# Patient Record
Sex: FEMALE | Race: WHITE | NOT HISPANIC OR LATINO | URBAN - METROPOLITAN AREA
[De-identification: names, ages, dates, MRNs, and addresses within clinical notes are randomized per-mention and may not be internally consistent; named-entity substitution may affect disease eponyms.]

---

## 2020-08-06 PROCEDURE — U0003 INFECTIOUS AGENT DETECTION BY NUCLEIC ACID (DNA OR RNA); SEVERE ACUTE RESPIRATORY SYNDROME CORONAVIRUS 2 (SARS-COV-2) (CORONAVIRUS DISEASE [COVID-19]), AMPLIFIED PROBE TECHNIQUE, MAKING USE OF HIGH THROUGHPUT TECHNOLOGIES AS DESCRIBED BY CMS-2020-01-R: HCPCS | Performed by: FAMILY MEDICINE

## 2020-08-11 ENCOUNTER — TELEPHONE (OUTPATIENT)
Dept: URGENT CARE | Facility: CLINIC | Age: 25
End: 2020-08-11

## 2022-11-21 ENCOUNTER — PATIENT ROUNDING (BHMG ONLY) (OUTPATIENT)
Dept: FAMILY MEDICINE CLINIC | Facility: CLINIC | Age: 27
End: 2022-11-21

## 2022-11-21 ENCOUNTER — OFFICE VISIT (OUTPATIENT)
Dept: FAMILY MEDICINE CLINIC | Facility: CLINIC | Age: 27
End: 2022-11-21

## 2022-11-21 VITALS
WEIGHT: 155.6 LBS | SYSTOLIC BLOOD PRESSURE: 122 MMHG | OXYGEN SATURATION: 100 % | HEIGHT: 65 IN | DIASTOLIC BLOOD PRESSURE: 76 MMHG | BODY MASS INDEX: 25.92 KG/M2 | HEART RATE: 97 BPM

## 2022-11-21 DIAGNOSIS — S22.070S CLOSED WEDGE COMPRESSION FRACTURE OF T10 VERTEBRA, SEQUELA: Primary | ICD-10-CM

## 2022-11-21 DIAGNOSIS — Z00.00 ENCOUNTER FOR ROUTINE HISTORY AND PHYSICAL EXAM IN FEMALE: ICD-10-CM

## 2022-11-21 PROBLEM — S22.070A CLOSED WEDGE COMPRESSION FRACTURE OF T10 VERTEBRA (HCC): Status: ACTIVE | Noted: 2022-11-21

## 2022-11-21 PROCEDURE — 99213 OFFICE O/P EST LOW 20 MIN: CPT | Performed by: INTERNAL MEDICINE

## 2022-11-21 RX ORDER — PSEUDOEPHEDRINE HYDROCHLORIDE 120 MG/1
TABLET, FILM COATED, EXTENDED RELEASE ORAL
COMMUNITY
Start: 2022-10-24 | End: 2022-11-21

## 2022-11-21 RX ORDER — METHOCARBAMOL 750 MG/1
TABLET, FILM COATED ORAL
COMMUNITY
Start: 2022-10-30 | End: 2022-12-15 | Stop reason: SDUPTHER

## 2022-11-21 RX ORDER — OXYCODONE HYDROCHLORIDE 5 MG/1
TABLET ORAL
COMMUNITY
Start: 2022-10-30 | End: 2022-11-21

## 2022-11-21 RX ORDER — ACETAMINOPHEN 500 MG
500 TABLET ORAL EVERY 6 HOURS PRN
COMMUNITY
Start: 2022-10-30 | End: 2022-11-24

## 2022-11-21 RX ORDER — IBUPROFEN 600 MG/1
600 TABLET ORAL EVERY 6 HOURS PRN
COMMUNITY
End: 2022-12-15 | Stop reason: SDUPTHER

## 2022-11-21 NOTE — PROGRESS NOTES
Makayla Ford Epley  1995  [id number]  Patient Care Team:  Jhon Ennis MD as PCP - General (Internal Medicine)    Makayla Ford Epley is a 27 y.o. female here today to establish care.  This patient is accompanied by their self who contributes to the history of their care.    Chief Complaint:    Chief Complaint   Patient presents with   • new patient preventative medicine service   • Fracture back     10/30/2022         History of Present Illness:    Makayla Ford Epley is a 27 y.o. female presenting today with Fall and Back Pain.   With regards to this she was seen on .  who had a fall from an exercise pole at a height of about 2-3 feet after which she landed on her head/neck. Immediately after the event patient felt that she heard a pop and since then she has been having significant tenderness in the thoracic spine region. Had some mild numbness and tingling soon after the event but that has resolved at this time. No loss of consciousness. Patient was placed on a C-collar and brought to the ED by EMS. She underwent CT neck.back with only a T10 compression fracture. Her pain is currently 2/10. She would like to dispose of her Oxycontin. She was also given Robaxin, tylenol.   Pain is typically in her ribs with quick movement. Denies weakness nmbness or tingling. Denies any urinary/bowel incontinence.     Patient has had no previous surgeries to the spine before. Does not take any medications on a daily basis.    She does not have a routine gynecologist, however would like to establish.      History reviewed. No pertinent past medical history.    Past Surgical History:   Procedure Laterality Date   • WISDOM TOOTH EXTRACTION          Family History   Problem Relation Age of Onset   • Mental illness Mother    • Migraine headaches Mother    • Osteoporosis Mother    • Thyroid disease Maternal Grandmother    • Stroke Maternal Grandmother    • Hyperlipidemia Maternal Grandmother    • Hypertension Maternal  "Grandmother    • Diabetes Maternal Grandmother    • Cancer Maternal Grandmother    • Arthritis Maternal Grandmother    • Heart attack Maternal Grandmother    • Kidney disease Maternal Grandmother    • Stroke Maternal Grandfather    • Hyperlipidemia Maternal Grandfather    • Arthritis Maternal Grandfather    • Stroke Paternal Grandmother        Social History     Socioeconomic History   • Marital status: Single   Tobacco Use   • Smoking status: Never   • Smokeless tobacco: Never   • Tobacco comments:     socially   Vaping Use   • Vaping Use: Every day   • Start date: 9/1/2022   • Substances: Nicotine   • Devices: Disposable   Substance and Sexual Activity   • Alcohol use: Yes     Comment: Social   • Drug use: Yes     Types: Marijuana   • Sexual activity: Defer     Partners: Male     Birth control/protection: None       No Known Allergies    Review of Systems:    Review of Systems   Constitutional: Negative.    HENT: Negative.    Respiratory: Negative.    Endocrine: Negative.    Genitourinary: Negative.    Musculoskeletal: Positive for back pain.   Neurological: Negative.    Hematological: Negative.        Vitals:    11/21/22 1446   BP: 122/76   Pulse: 97   SpO2: 100%   Weight: 70.6 kg (155 lb 9.6 oz)   Height: 165.1 cm (65\")     Body mass index is 25.89 kg/m².      Current Outpatient Medications:   •  acetaminophen (TYLENOL) 500 MG tablet, Take 500 mg by mouth Every 6 (Six) Hours As Needed., Disp: , Rfl:   •  ibuprofen (ADVIL,MOTRIN) 600 MG tablet, Take 600 mg by mouth Every 6 (Six) Hours As Needed for Mild Pain., Disp: , Rfl:   •  methocarbamol (ROBAXIN) 750 MG tablet, , Disp: , Rfl:     Physical Exam:    Physical Exam  Vitals and nursing note reviewed.   Constitutional:       General: She is not in acute distress.     Appearance: She is well-developed. She is not diaphoretic.   HENT:      Head: Normocephalic and atraumatic.      Right Ear: External ear normal.      Left Ear: External ear normal.      Mouth/Throat: "      Mouth: Mucous membranes are moist.      Pharynx: No oropharyngeal exudate.   Eyes:      General: No scleral icterus.        Right eye: No discharge.      Conjunctiva/sclera: Conjunctivae normal.   Neck:      Thyroid: No thyromegaly.      Vascular: No JVD.      Trachea: No tracheal deviation.   Cardiovascular:      Rate and Rhythm: Normal rate and regular rhythm.      Heart sounds: Normal heart sounds.      Comments: PMI nondisplaced  Pulmonary:      Effort: Pulmonary effort is normal.      Breath sounds: Normal breath sounds. No wheezing or rales.   Abdominal:      General: Bowel sounds are normal.      Palpations: Abdomen is soft.      Tenderness: There is no abdominal tenderness. There is no guarding or rebound.   Musculoskeletal:         General: Tenderness present.      Cervical back: Normal range of motion and neck supple.      Comments: Normal gait.  Normal upper and lower extremity strength proximally and distally flexor extensor compartments.  He does have midline proximately T9-T11 tenderness.  There is some thoracic paraspinous guarding.  Range of motion appears full.   Lymphadenopathy:      Cervical: No cervical adenopathy.   Skin:     General: Skin is warm and dry.      Capillary Refill: Capillary refill takes less than 2 seconds.      Coloration: Skin is not pale.      Findings: No rash.   Neurological:      Mental Status: She is alert and oriented to person, place, and time.      Motor: No abnormal muscle tone.      Coordination: Coordination normal.   Psychiatric:         Mood and Affect: Mood normal.         Behavior: Behavior normal.         Judgment: Judgment normal.         Procedures    Results Review:    I reviewed the patient's new clinical results.  Exam/Procedure: CT CERVICAL SPINE WO IV CONTRAST ordered by ALYSON ANTONY, [id number]     CLINICAL INDICATION:   fall from height     TECHNIQUE:   Imaging of the entire cervical spine (to include the cervicothoracic junction) was performed, using  spiral technique, without contrast administration. Reformatted images in the coronal and sagittal planes were generated from the axial data set to facilitate diagnostic accuracy and/or surgical planning.     Contiguous axial CT images of the entire thoracic spine were reformatted from a CT of the chest/aorta. Reconstructed images in the coronal and sagittal planes were generated from the axial data set to facilitate diagnostic accuracy and/or surgical planning.     Contiguous axial CT images of the entire lumbar spine were reformatted from a CT of the abdomen and pelvis. Reconstructed images in the coronal and sagittal planes were generated from the axial data set to facilitate diagnostic accuracy and/or surgical planning.     Total DLP (Dose-Length Product): 2210. Please note: The reported value represents the total of one or more individual components during the CT acquisition on this date and at this time, and as such, the same value may appear in more than one CT report depending on the interpreting/reporting physicians.     COMPARISON:   None.     FINDINGS:   Cervical Spine:     Vertebrae: No acute fracture.     Alignment: Normal spinal alignment.     Paraspinal Soft Tissues: No paraspinal hematoma.     Lung Apices: No pneumothorax at the lung apices.     Thoracic Spine:   Vertebrae: Subtle superior endplate compression fracture at T10 without loss of vertebral body height at this level. No extension into the posterior elements. No retropulsion of bony fragments into the spinal canal.     Alignment: Normal spinal alignment.     Paraspinal Soft Tissues: Please see report for CT Chest/Aorta for soft tissue findings.     Lumbar Spine:   Vertebrae: No acute fracture.     Alignment: Normal spinal alignment.       Assessment/Plan:     Problem List Items Addressed This Visit        Neuro    Closed wedge compression fracture of T10 vertebra (HCC) - Primary   Other Visit Diagnoses     Encounter for routine history and  physical exam in female        Relevant Orders    Ambulatory Referral to Gynecology      I have cleared her to go back to work with no stage work no brisk movements no lifting greater than 10 pounds.  She should keep her spine follow-up at  on December 10.  Continue Tylenol and Robaxin as needed.  To dispose of her pain medications she should go to any pharmacy for   med disposal    Plan of care reviewed with patient at the conclusion of today's visit. Education was provided regarding diagnosis and management.  Patient verbalizes understanding of and agreement with management plan.    Return in about 1 year (around 11/21/2023) for Annual.    Jhon Ennis MD      Please note than portions of this note were completed [facility] a Voice Recognition Program

## 2022-12-08 ENCOUNTER — TELEPHONE (OUTPATIENT)
Dept: FAMILY MEDICINE CLINIC | Facility: CLINIC | Age: 27
End: 2022-12-08

## 2022-12-08 DIAGNOSIS — S22.070S CLOSED WEDGE COMPRESSION FRACTURE OF T10 VERTEBRA, SEQUELA: Primary | ICD-10-CM

## 2022-12-08 NOTE — TELEPHONE ENCOUNTER
Caller: Epley, Makayla F    Relationship: Self    Best call back number: [phone]    What is the medical concern/diagnosis: compression spinal fracture    What specialty or service is being requested: orthopedic surgeons or neurosurgeons    What is the provider, practice or medical service name:     What is the office location:     What is the office phone number:     Any additional details: [facility]

## 2022-12-15 ENCOUNTER — OFFICE VISIT (OUTPATIENT)
Dept: FAMILY MEDICINE CLINIC | Facility: CLINIC | Age: 27
End: 2022-12-15

## 2022-12-15 VITALS
HEIGHT: 65 IN | SYSTOLIC BLOOD PRESSURE: 120 MMHG | WEIGHT: 164.6 LBS | OXYGEN SATURATION: 98 % | BODY MASS INDEX: 27.42 KG/M2 | DIASTOLIC BLOOD PRESSURE: 78 MMHG | HEART RATE: 84 BPM

## 2022-12-15 DIAGNOSIS — Z11.59 ENCOUNTER FOR HEPATITIS C SCREENING TEST FOR LOW RISK PATIENT: ICD-10-CM

## 2022-12-15 DIAGNOSIS — Z23 NEED FOR IMMUNIZATION AGAINST INFLUENZA: Primary | ICD-10-CM

## 2022-12-15 DIAGNOSIS — Z00.00 ANNUAL PHYSICAL EXAM: ICD-10-CM

## 2022-12-15 DIAGNOSIS — S22.070S CLOSED WEDGE COMPRESSION FRACTURE OF T10 VERTEBRA, SEQUELA: ICD-10-CM

## 2022-12-15 PROCEDURE — 3008F BODY MASS INDEX DOCD: CPT | Performed by: INTERNAL MEDICINE

## 2022-12-15 PROCEDURE — 99395 PREV VISIT EST AGE 18-39: CPT | Performed by: INTERNAL MEDICINE

## 2022-12-15 PROCEDURE — 2014F MENTAL STATUS ASSESS: CPT | Performed by: INTERNAL MEDICINE

## 2022-12-15 RX ORDER — IBUPROFEN 600 MG/1
600 TABLET ORAL EVERY 6 HOURS PRN
Qty: 90 TABLET | Refills: 2 | Status: SHIPPED | OUTPATIENT
Start: 2022-12-15

## 2022-12-15 RX ORDER — METHOCARBAMOL 750 MG/1
750 TABLET, FILM COATED ORAL 3 TIMES DAILY
Qty: 90 TABLET | Refills: 0 | Status: SHIPPED | OUTPATIENT
Start: 2022-12-15

## 2022-12-15 NOTE — PROGRESS NOTES
Makayla F Epley  1995  [id number]  Patient Care Team:  Jhon Ennis MD as PCP - General (Internal Medicine)    Makayla F Epley is a 27 y.o. female here today for annual exam.  This patient is accompanied by their self who contributes to the history of their care.    Chief Complaint:    Chief Complaint   Patient presents with   • Annual Exam     Wants HPV vaccine           History of Present Illness:    She is here for annual exam. She has been pain free from her  t10 fractures. Aside for lifting her 30 lb dog food bags her activities at home are normal. She is not at work as they won't allow her to return until cleared with no restrictions.     Feels well physically. She has no further rib/back pain. Completed school for the semester. Needs to see opth and has upcoming appt with dentistry. Need tdap.   Works out with pole exercises and walking 5 days per week she is interested in fasting labs.  She has upcoming gynecology appointment in February.  She check with her mother, she did have a GArdisil series completed in 2011.      History reviewed. No pertinent past medical history.    Past Surgical History:   Procedure Laterality Date   • WISDOM TOOTH EXTRACTION          Family History   Problem Relation Age of Onset   • Mental illness Mother    • Migraine headaches Mother    • Osteoporosis Mother    • Thyroid disease Maternal Grandmother    • Stroke Maternal Grandmother    • Hyperlipidemia Maternal Grandmother    • Hypertension Maternal Grandmother    • Diabetes Maternal Grandmother    • Cancer Maternal Grandmother    • Arthritis Maternal Grandmother    • Heart attack Maternal Grandmother    • Kidney disease Maternal Grandmother    • Stroke Maternal Grandfather    • Hyperlipidemia Maternal Grandfather    • Arthritis Maternal Grandfather    • Stroke Paternal Grandmother        Social History     Socioeconomic History   • Marital status: Single   Tobacco Use   • Smoking status: Never   • Smokeless tobacco:  Never   • Tobacco comments:     socially   Vaping Use   • Vaping Use: Every day   • Start date: 9/1/2022   • Substances: Nicotine   • Devices: Disposable   Substance and Sexual Activity   • Alcohol use: Yes     Comment: Social   • Drug use: Yes     Types: Marijuana   • Sexual activity: Defer     Partners: Male     Birth control/protection: None       No Known Allergies    Depression: PHQ-2 Depression Screening  Little interest or pleasure in doing things?  0   Feeling down, depressed, or hopeless? 0   PHQ-2 Total Score  0      Immunization History   Administered Date(s) Administered   • Covid-19 (Pfizer) Gray Cap 09/21/2022, 10/12/2022   • DTP / HiB [birth year]   • DTaP 10/19/2022   • DTaP / HiB / IPV 1995, 1997, 1997   • DTaP / IPV 02/05/2001   • DTaP 5 02/25/1999   • Hep B, Adolescent or Pediatric 1995, 1995, 02/09/1998   • Hepatitis A 11/21/2012   • Hib (PRP-T) 02/25/1999   • Hpv9 11/21/2012   • IPV 02/05/2001   • Influenza, Unspecified 10/03/2022   • MMR 02/25/1999, 02/05/2001   • Meningococcal MCV4P (Menactra) 11/21/2012   • Tdap 01/18/2011   • Varicella 02/09/1998, 11/21/2012       Intimate partner violence ( Screen on initial visit, pregnant women, women with injuries, older adult with injury or evidence of neglect):  -Violence can be a problem in many people's lives, so I now ask every patient about trauma or abuse they may have experienced in a relationship.  • Stress/Safety - Do you feel safe in your relationship? n/a  • Afraid/Abused - Have you ever been in a relationship where you were threatened, hurt, or afraid? y  • Friend/Family - Are your friends aware you have been hurt? After the fact  • Emergency Plan - Do you have a safe place to go and the resources you need in an emergency? y    Review of Systems:    Review of Systems   Constitutional: Negative.    HENT: Negative.    Endocrine: Negative.    Genitourinary: Negative.    Musculoskeletal:  "Negative.    Skin: Negative.        Vitals:    12/15/22 0958   BP: 120/78   Pulse: 84   SpO2: 98%   Weight: 74.7 kg (164 lb 9.6 oz)   Height: 165.1 cm (65\")     Body mass index is 27.39 kg/m².      Current Outpatient Medications:   •  ibuprofen (ADVIL,MOTRIN) 600 MG tablet, Take 1 tablet by mouth Every 6 (Six) Hours As Needed for Mild Pain., Disp: 90 tablet, Rfl: 2  •  methocarbamol (ROBAXIN) 750 MG tablet, Take 1 tablet by mouth 3 (Three) Times a Day., Disp: 90 tablet, Rfl: 0    Physical Exam:    Physical Exam  Vitals and nursing note reviewed.   Constitutional:       General: She is not in acute distress.     Appearance: She is well-developed. She is not diaphoretic.   HENT:      Head: Normocephalic and atraumatic.      Right Ear: External ear normal.      Left Ear: External ear normal.      Mouth/Throat:      Pharynx: No oropharyngeal exudate.   Eyes:      General: No scleral icterus.        Right eye: No discharge.      Conjunctiva/sclera: Conjunctivae normal.   Neck:      Thyroid: No thyromegaly.      Vascular: No JVD.      Trachea: No tracheal deviation.   Cardiovascular:      Rate and Rhythm: Normal rate and regular rhythm.      Heart sounds: Normal heart sounds.      Comments: PMI nondisplaced  Pulmonary:      Effort: Pulmonary effort is normal.      Breath sounds: Normal breath sounds. No wheezing or rales.   Abdominal:      General: Bowel sounds are normal.      Palpations: Abdomen is soft.      Tenderness: There is no abdominal tenderness. There is no guarding or rebound.   Musculoskeletal:         General: No swelling, tenderness, deformity or signs of injury.      Cervical back: Normal range of motion and neck supple.      Comments: Normal gait    Lymphadenopathy:      Cervical: No cervical adenopathy.   Skin:     General: Skin is warm and dry.      Capillary Refill: Capillary refill takes less than 2 seconds.      Coloration: Skin is not pale.      Findings: No rash.   Neurological:      Mental Status: " She is alert and oriented to person, place, and time.      Motor: No abnormal muscle tone.      Coordination: Coordination normal.   Psychiatric:         Mood and Affect: Mood normal.         Behavior: Behavior normal.         Judgment: Judgment normal.         Procedures    Results Review:    None    Assessment/Plan:     Problem List Items Addressed This Visit        Health Encounters    Annual physical exam    Relevant Orders    CBC & Differential    Hepatitis C Antibody    Lipid Panel    TSH Rfx On Abnormal To Free T4       Neuro    Closed wedge compression fracture of T10 vertebra (HCC)    Current Assessment & Plan     Awiats clearance  To return to full duty. Discussed with referrals/patient        Other Visit Diagnoses     Need for immunization against influenza    -  Primary    Encounter for hepatitis C screening test for low risk patient        Relevant Orders    Hepatitis C Antibody          Plan of care was reviewed with patient at the conclusion of today's visit. Counseled patient with regards to good nutrition and diet. Maintaining a healthy lifestyle including exercise and physical activities. Spoke with patient on ways to reduce stress, getting adequate sleep and injury prevention.  Discussed mammogram, colon cancer screening, osteoporosis and pap smear including benefit of early detection and potential need for follow-up. Patient agrees to screening mammogram, colonoscopy, bone density and gyn referral today. Annual dental and eye exams were encouraged. Encouraged patient to continue to follow up with annual immunizations.     Return in about 1 year (around 12/15/2023) for Annual.    Jhon Ennis MD    Please note than portions of this note were completed wth a Voice Recognition Program

## 2023-02-21 ENCOUNTER — OFFICE VISIT (OUTPATIENT)
Dept: OBSTETRICS AND GYNECOLOGY | Facility: CLINIC | Age: 28
End: 2023-02-21
Payer: MEDICAID

## 2023-02-21 VITALS — DIASTOLIC BLOOD PRESSURE: 72 MMHG | BODY MASS INDEX: 26.43 KG/M2 | WEIGHT: 158.8 LBS | SYSTOLIC BLOOD PRESSURE: 124 MMHG

## 2023-02-21 DIAGNOSIS — Z01.419 PAP TEST, AS PART OF ROUTINE GYNECOLOGICAL EXAMINATION: Primary | ICD-10-CM

## 2023-02-21 PROCEDURE — 99385 PREV VISIT NEW AGE 18-39: CPT | Performed by: OBSTETRICS & GYNECOLOGY

## 2023-02-21 PROCEDURE — 2014F MENTAL STATUS ASSESS: CPT | Performed by: OBSTETRICS & GYNECOLOGY

## 2023-02-21 PROCEDURE — 3008F BODY MASS INDEX DOCD: CPT | Performed by: OBSTETRICS & GYNECOLOGY

## 2023-02-21 NOTE — PROGRESS NOTES
Gynecologic Annual Exam Note        Establish Care and Gynecologic Exam        Subjective     HPI  Makayla F Epley is a 28 y.o. No obstetric history on file. female who presents for annual well woman exam as a new patient. There were no changes to her medical or surgical history since her last visit.. Patient reports problems with: none. Patient's last menstrual period was 01/31/2023 (exact date).. Her periods occur every 25-35 days , lasting 4 days. The flow is moderate.. She reports dysmenorrhea is moderate to severe, first 2 days of flow. Partner Status: Marital Status: single.  She is sexually active. She has not had new partners.. STD testing recommendations have been explained to the patient and she does desire STD testing.    Additional OB/GYN History   Current contraception: contraceptive methods: None  Desires to: do not start contraception  Thromboembolic Disease: none  Age of menarche: 13    History of STD: yes GC/CHLAMYDIA    Last Pap : Unknown. Results: unknown . HPV: unknown .   Last Completed Pap Smear     This patient has no relevant Health Maintenance data.           History of abnormal Pap smear: no  Gardasil status:completed  Family history of uterine, colon, breast, or ovarian cancer: yes - MGM with Breast, MGA with ovarian  Performs monthly Self-Breast Exam: yes  Exercises Regularly:yes  Feelings of Anxiety or Depression: no  Tobacco Usage?: No       Current Outpatient Medications:   •  ibuprofen (ADVIL,MOTRIN) 600 MG tablet, Take 1 tablet by mouth Every 6 (Six) Hours As Needed for Mild Pain., Disp: 90 tablet, Rfl: 2  •  methocarbamol (ROBAXIN) 750 MG tablet, Take 1 tablet by mouth 3 (Three) Times a Day., Disp: 90 tablet, Rfl: 0     Patient denies the need for medication refills today.    OB History    No obstetric history on file.         Health Maintenance   Topic Date Due   • Annual Gynecologic Pelvic and Breast Exam  Never done   • HPV VACCINES (2 - 3-dose series) 12/19/2012   •  HEPATITIS C SCREENING  Never done   • PAP SMEAR  Never done   • COVID-19 Vaccine (3 - Booster for Pfizer series) 12/07/2022   • ANNUAL PHYSICAL  12/15/2023   • TDAP/TD VACCINES (3 - Td or Tdap) 01/06/2031   • INFLUENZA VACCINE  Completed   • Pneumococcal Vaccine 0-64  Aged Out       History reviewed. No pertinent past medical history.     Past Surgical History:   Procedure Laterality Date   • WISDOM TOOTH EXTRACTION         The additional following portions of the patient's history were reviewed and updated as appropriate: allergies, current medications, past family history, past medical history, past social history and past surgical history.    Review of Systems   Constitutional: Negative.    HENT: Negative.    Eyes: Negative.    Respiratory: Negative.    Cardiovascular: Negative.    Gastrointestinal: Negative.    Endocrine: Negative.    Genitourinary: Negative.    Musculoskeletal: Negative.    Skin: Negative.    Allergic/Immunologic: Negative.    Neurological: Negative.    Hematological: Negative.    Psychiatric/Behavioral: Negative.          I have reviewed and agree with the HPI, ROS, and historical information as entered above. Sameer Mendoza MD        Objective   /72   Wt 72 kg (158 lb 12.8 oz)   LMP 01/31/2023 (Exact Date)   BMI 26.43 kg/m²     Physical Exam  Vitals and nursing note reviewed. Exam conducted with a chaperone present.   Constitutional:       Appearance: She is well-developed.   HENT:      Head: Normocephalic and atraumatic.   Neck:      Thyroid: No thyroid mass or thyromegaly.   Cardiovascular:      Rate and Rhythm: Normal rate and regular rhythm.      Heart sounds: No murmur heard.  Pulmonary:      Effort: Pulmonary effort is normal. No retractions.      Breath sounds: Normal breath sounds. No wheezing, rhonchi or rales.   Chest:      Chest wall: No mass or tenderness.   Breasts:     Right: Normal. No mass, nipple discharge, skin change or tenderness.      Left: Normal. No mass,  nipple discharge, skin change or tenderness.   Abdominal:      General: Bowel sounds are normal.      Palpations: Abdomen is soft. Abdomen is not rigid. There is no mass.      Tenderness: There is no abdominal tenderness. There is no guarding.      Hernia: No hernia is present. There is no hernia in the left inguinal area.   Genitourinary:     Labia:         Right: No rash, tenderness or lesion.         Left: No rash, tenderness or lesion.       Vagina: Normal. No vaginal discharge or lesions.      Cervix: No cervical motion tenderness, discharge, lesion or cervical bleeding.      Uterus: Normal. Not enlarged, not fixed and not tender.       Adnexa:         Right: No mass or tenderness.          Left: No mass or tenderness.        Rectum: No external hemorrhoid.   Musculoskeletal:      Cervical back: Normal range of motion. No muscular tenderness.   Neurological:      Mental Status: She is alert and oriented to person, place, and time.   Psychiatric:         Behavior: Behavior normal.            Assessment and Plan    Problem List Items Addressed This Visit    None  Visit Diagnoses     Pap test, as part of routine gynecological examination    -  Primary    Relevant Orders    LIQUID-BASED PAP SMEAR, [facility] ([facility])          1. GYN annual well woman exam.   2. Reviewed pap guidelines.   3. Encouraged use of condoms for STD prevention.  4. Recommended use of Vitamin D replacement and getting adequate calcium in her diet. (1500mg)  5. Reviewed monthly self breast exams.  Instructed to call with lumps, pain, or breast discharge.    6. Reviewed BMI and weight loss as preventative health measures.   7. Reviewed exercise as a preventative health measures.   8. RTC in 1 year or PRN with problems  No follow-ups on file.      Sameer Mendoza MD  02/21/2023

## 2023-02-23 ENCOUNTER — TELEPHONE (OUTPATIENT)
Dept: OBSTETRICS AND GYNECOLOGY | Facility: CLINIC | Age: 28
End: 2023-02-23
Payer: MEDICAID

## 2023-02-23 DIAGNOSIS — B37.31 YEAST INFECTION OF THE VAGINA: Primary | ICD-10-CM

## 2023-02-23 RX ORDER — FLUCONAZOLE 150 MG/1
150 TABLET ORAL DAILY
Qty: 2 TABLET | Refills: 0 | Status: SHIPPED | OUTPATIENT
Start: 2023-02-23

## 2023-02-23 NOTE — TELEPHONE ENCOUNTER
Pt states she has had vaginal itching and clumpy, white vaginal discharge since this AM. She states she had a yeast infection last month which was treated with Monistat OTC. Symptoms did improve for some time. Pt last seen in office 2/21. Rx sent in for diflucan.

## 2023-02-24 LAB — REF LAB TEST METHOD: NORMAL

## 2023-06-15 ENCOUNTER — OFFICE VISIT (OUTPATIENT)
Dept: OBSTETRICS AND GYNECOLOGY | Facility: CLINIC | Age: 28
End: 2023-06-15
Payer: MEDICAID

## 2023-06-15 VITALS — BODY MASS INDEX: 26.56 KG/M2 | DIASTOLIC BLOOD PRESSURE: 78 MMHG | SYSTOLIC BLOOD PRESSURE: 112 MMHG | WEIGHT: 159.6 LBS

## 2023-06-15 DIAGNOSIS — Z11.3 SCREENING FOR STD (SEXUALLY TRANSMITTED DISEASE): Primary | ICD-10-CM

## 2023-06-15 DIAGNOSIS — Z20.2 EXPOSURE TO STD: ICD-10-CM

## 2023-06-15 PROCEDURE — 99213 OFFICE O/P EST LOW 20 MIN: CPT | Performed by: OBSTETRICS & GYNECOLOGY

## 2023-06-15 NOTE — PROGRESS NOTES
Chief Complaint   Patient presents with    Follow-up         Subjective   HPI  Makayla F Epley is a 28 y.o. female, No obstetric history on file., who presents for screening for STD's.     Her last LMP was Patient's last menstrual period was 06/09/2023 (exact date).. She reports denies knowledge of risky exposure. The patient denies vaginal discharge, dysuria, vulvar itching, vulvar burning, and vaginal odor.  She is requesting STD testing without blood work. The patient reports a past history of: chlamydia and gonorrhea.. Patient reports she just got over a yeast infection, she did OTC treatment.       Additional OB/GYN History   Last Pap :   Last Completed Pap Smear            PAP SMEAR (Every 3 Years) Next due on 2/21/2026 02/21/2023  LIQUID-BASED PAP SMEAR, [facility] ([facility])                    OB History    No obstetric history on file.           Current Outpatient Medications:     methocarbamol (ROBAXIN) 750 MG tablet, Take 1 tablet by mouth 3 (Three) Times a Day., Disp: 90 tablet, Rfl: 0    fluconazole (Diflucan) 150 MG tablet, Take 1 tablet by mouth Daily. Take 1 tablet now, then 1 tablet in 3 days (Patient not taking: Reported on 6/15/2023), Disp: 2 tablet, Rfl: 0    ibuprofen (ADVIL,MOTRIN) 600 MG tablet, Take 1 tablet by mouth Every 6 (Six) Hours As Needed for Mild Pain. (Patient not taking: Reported on 6/15/2023), Disp: 90 tablet, Rfl: 2     No past medical history on file.     Past Surgical History:   Procedure Laterality Date    WISDOM TOOTH EXTRACTION         The additional following portions of the patient's history were reviewed and updated as appropriate: allergies and current medications.    Review of Systems  All other systems reviewed and are negative.     I have reviewed and agree with the HPI, ROS, and historical information as entered above. Sameer Mendoza MD      Objective   /78   Wt 72.4 kg (159 lb 9.6 oz)   LMP 06/09/2023 (Exact Date)   BMI 26.56 kg/m²      Physical Exam  Vitals and nursing note reviewed. Exam conducted with a chaperone present.   Genitourinary:     Labia:         Right: No rash, tenderness or lesion.         Left: No rash, tenderness or lesion.       Vagina: Normal. No lesions.      Cervix: No cervical motion tenderness, discharge, lesion or cervical bleeding.      Uterus: Normal. Not enlarged, not fixed and not tender.       Adnexa:         Right: No mass or tenderness.          Left: No mass or tenderness.        Rectum: No external hemorrhoid.      Comments: Chaperone Present      Wet mount performed? No.    Assessment & Plan     Assessment and Plan    Problem List Items Addressed This Visit    None  Visit Diagnoses       Screening for STD (sexually transmitted disease)    -  Primary    Relevant Orders    NuSwab VG+, Candida 6sp    HIV-1 RNA, Quantitative, PCR (graph)    RPR    Hepatitis C Antibody    Herpes Simplex Virus Culture - Swab, Cervix            See orders for STD cultures and assays  Pt will call for results        Sameer Mendoza MD  06/15/2023

## 2023-06-17 LAB
HCV IGG SERPL QL IA: NON REACTIVE
HIV1 RNA # SERPL NAA+PROBE: <20 COPIES/ML
HIV1 RNA SERPL NAA+PROBE-LOG#: NORMAL LOG10COPY/ML
HSV SPEC CULT: NEGATIVE
RPR SER QL: NON REACTIVE

## 2023-06-19 LAB
A VAGINAE DNA VAG QL NAA+PROBE: ABNORMAL SCORE
BVAB2 DNA VAG QL NAA+PROBE: ABNORMAL SCORE
C ALBICANS DNA VAG QL NAA+PROBE: NEGATIVE
C GLABRATA DNA VAG QL NAA+PROBE: NEGATIVE
C KRUSEI DNA VAG QL NAA+PROBE: NEGATIVE
C LUSITANIAE DNA VAG QL NAA+PROBE: NEGATIVE
C TRACH DNA VAG QL NAA+PROBE: NEGATIVE
CANDIDA DNA VAG QL NAA+PROBE: NEGATIVE
MEGA1 DNA VAG QL NAA+PROBE: ABNORMAL SCORE
N GONORRHOEA DNA VAG QL NAA+PROBE: NEGATIVE
T VAGINALIS DNA VAG QL NAA+PROBE: POSITIVE

## 2023-08-17 ENCOUNTER — TELEPHONE (OUTPATIENT)
Dept: FAMILY MEDICINE CLINIC | Facility: CLINIC | Age: 28
End: 2023-08-17

## 2023-08-17 RX ORDER — METHOCARBAMOL 750 MG/1
750 TABLET, FILM COATED ORAL DAILY PRN
Qty: 30 TABLET | Refills: 1 | Status: SHIPPED | OUTPATIENT
Start: 2023-08-17

## 2023-08-17 NOTE — TELEPHONE ENCOUNTER
Caller: Epley, Makayla F    Relationship: Self    Best call back number: [phone]       What was the call regarding: PATIENT IS REQUESTING GENERIC ROBAXIN 750MG FOR BACK PAIN THAT SHE HAS FREQUENTLY AFTER WORK.     Is it okay if the provider responds through MyChart: YES      [facility] #[id number] [address]  AT [facility] - [phone] [facility] [phone]  [phone]      English

## 2023-09-18 ENCOUNTER — OFFICE VISIT (OUTPATIENT)
Dept: FAMILY MEDICINE CLINIC | Facility: CLINIC | Age: 28
End: 2023-09-18
Payer: MEDICAID

## 2023-09-18 VITALS
DIASTOLIC BLOOD PRESSURE: 70 MMHG | WEIGHT: 157.4 LBS | BODY MASS INDEX: 26.23 KG/M2 | HEIGHT: 65 IN | OXYGEN SATURATION: 99 % | HEART RATE: 96 BPM | SYSTOLIC BLOOD PRESSURE: 140 MMHG

## 2023-09-18 DIAGNOSIS — I10 PRIMARY HYPERTENSION: Primary | ICD-10-CM

## 2023-09-18 PROCEDURE — 99214 OFFICE O/P EST MOD 30 MIN: CPT | Performed by: INTERNAL MEDICINE

## 2023-09-18 PROCEDURE — 1159F MED LIST DOCD IN RCRD: CPT | Performed by: INTERNAL MEDICINE

## 2023-09-18 PROCEDURE — 1160F RVW MEDS BY RX/DR IN RCRD: CPT | Performed by: INTERNAL MEDICINE

## 2023-09-18 NOTE — PROGRESS NOTES
Chief Complaint   Patient presents with    Hypertension       HPI:  Makayla F Epley is a 28 y.o. female who presents today for elevated blood pressure reading at work and on a few occasions with wrist cuff at home.    ROS:  Constitutional: no fevers, night sweats or unexplained weight loss  Eyes: no vision changes  ENT: no runny nose, ear pain, sore throat  Cardio: no chest pain, palpitations  Pulm: no shortness of breath, wheezing, or cough  GI: no abdominal pain or changes in bowel movements  : no difficulty urinating  MSK: no difficulty ambulating, no joint pain  Neuro: no weakness, dizziness or headache  Psych: no trouble sleeping  Endo: no change in appetite      History reviewed. No pertinent past medical history.   Family History   Problem Relation Age of Onset    Mental illness Mother     Migraine headaches Mother     Osteoporosis Mother     Stroke Paternal Grandmother     Breast cancer Maternal Grandmother     Thyroid disease Maternal Grandmother     Stroke Maternal Grandmother     Hyperlipidemia Maternal Grandmother     Hypertension Maternal Grandmother     Diabetes Maternal Grandmother     Cancer Maternal Grandmother     Arthritis Maternal Grandmother     Heart attack Maternal Grandmother     Kidney disease Maternal Grandmother     Stroke Maternal Grandfather     Hyperlipidemia Maternal Grandfather     Arthritis Maternal Grandfather     Ovarian cancer Maternal Aunt     Uterine cancer Neg Hx     Colon cancer Neg Hx       Social History     Socioeconomic History    Marital status: Single   Tobacco Use    Smoking status: Never    Smokeless tobacco: Never    Tobacco comments:     socially   Vaping Use    Vaping Use: Never used   Substance and Sexual Activity    Alcohol use: Yes     Comment: Social    Drug use: Yes     Types: Marijuana    Sexual activity: Yes     Partners: Male     Birth control/protection: None      No Known Allergies   Immunization History   Administered Date(s) Administered    Covid-19  (Pfizer) Gray Cap Monovalent 10/12/2022    DTP / HiB 1997, 1997, 1997    DTaP 10/19/2022    DTaP / HiB / IPV 1997, 1997, 1997    DTaP / IPV 02/05/2001    DTaP 5 02/25/1999    Hep B, Adolescent or Pediatric 1997, 1997, 02/09/1998    Hepatitis A 11/21/2012    Hib (PRP-T) 02/25/1999    Hpv9 11/21/2012    IPV 02/05/2001    Influenza, Unspecified 10/03/2022    MMR 02/25/1999, 02/05/2001    Meningococcal MCV4P (Menactra) 11/21/2012    Tdap 01/18/2011, 01/06/2021    Varicella 02/09/1998, 11/21/2012        PE:  Vitals:    09/18/23 1529   BP: 140/70   Pulse: 96   SpO2: 99%      Body mass index is 26.19 kg/m².    Gen Appearance: NAD  HEENT: Normocephalic, PERRLA, no thyromegaly, trache midline  Heart: RRR, normal S1 and S2, no murmur  Lungs: CTA b/l, no wheezing, no crackles  Abdomen: Soft, non-tender, non-distended, no guarding and BSx4  MSK: Moves all extremities well, normal gait, no peripheral edema  Pulses: Palpable and equal b/l  Lymph nodes: No palpable lymphadenopathy   Neuro: No focal deficits      Current Outpatient Medications   Medication Sig Dispense Refill    methocarbamol (ROBAXIN) 750 MG tablet Take 1 tablet by mouth Daily As Needed for Muscle Spasms. 30 tablet 1     No current facility-administered medications for this visit.      No personal history of high blood pressure.  140/70 BP today.  Recommend obtaining arm cuff and checking blood pressure after resting for 5 minutes at home.  Discussed nonpharmaceutical measures to lower blood pressure including dietary changes and routine physical activity.    Hold off on starting any medication today.  Recommend checking blood work and follow-up with PCP for recheck in 1 month.    Counseling was given to patient for the following topics: instructions for management, risk factor reductions, impressions, and risks and benefits of treatment options . Total time of the encounter was 30 minutes and 15 minutes was spent  face to face counseling.      Diagnoses and all orders for this visit:    1. Primary hypertension (Primary)  -     CBC & Differential; Future  -     Hemoglobin A1c; Future  -     Comprehensive Metabolic Panel; Future  -     Lipid Panel; Future  -     TSH+Free T4; Future  -     Urinalysis With Culture If Indicated - Urine, Clean Catch; Future         Return in about 4 weeks (around 10/16/2023) for with Dr. Ennis.     Dictated Utilizing Dragon Dictation    Please note that portions of this note were completed with a voice recognition program.    Part of this note may be an electronic transcription/translation of spoken language to printed text using the Dragon Dictation System.

## 2023-09-19 ENCOUNTER — LAB (OUTPATIENT)
Dept: LAB | Facility: HOSPITAL | Age: 28
End: 2023-09-19
Payer: MEDICAID

## 2023-09-19 DIAGNOSIS — Z00.00 ANNUAL PHYSICAL EXAM: ICD-10-CM

## 2023-09-19 DIAGNOSIS — I10 PRIMARY HYPERTENSION: ICD-10-CM

## 2023-09-19 DIAGNOSIS — Z11.59 ENCOUNTER FOR HEPATITIS C SCREENING TEST FOR LOW RISK PATIENT: ICD-10-CM

## 2023-09-19 LAB
BASOPHILS # BLD AUTO: 0.05 10*3/MM3 (ref 0–0.2)
BASOPHILS NFR BLD AUTO: 0.8 % (ref 0–1.5)
BILIRUB UR QL STRIP: NEGATIVE
CLARITY UR: CLEAR
COLOR UR: YELLOW
DEPRECATED RDW RBC AUTO: 40.7 FL (ref 37–54)
EOSINOPHIL # BLD AUTO: 0.1 10*3/MM3 (ref 0–0.4)
EOSINOPHIL NFR BLD AUTO: 1.5 % (ref 0.3–6.2)
ERYTHROCYTE [DISTWIDTH] IN BLOOD BY AUTOMATED COUNT: 12.3 % (ref 12.3–15.4)
GLUCOSE UR STRIP-MCNC: NEGATIVE MG/DL
HCT VFR BLD AUTO: 36.8 % (ref 34–46.6)
HGB BLD-MCNC: 12.4 G/DL (ref 12–15.9)
HGB UR QL STRIP.AUTO: NEGATIVE
IMM GRANULOCYTES # BLD AUTO: 0.02 10*3/MM3 (ref 0–0.05)
IMM GRANULOCYTES NFR BLD AUTO: 0.3 % (ref 0–0.5)
KETONES UR QL STRIP: NEGATIVE
LEUKOCYTE ESTERASE UR QL STRIP.AUTO: NEGATIVE
LYMPHOCYTES # BLD AUTO: 1.64 10*3/MM3 (ref 0.7–3.1)
LYMPHOCYTES NFR BLD AUTO: 25.1 % (ref 19.6–45.3)
MCH RBC QN AUTO: 30.7 PG (ref 26.6–33)
MCHC RBC AUTO-ENTMCNC: 33.7 G/DL (ref 31.5–35.7)
MCV RBC AUTO: 91.1 FL (ref 79–97)
MONOCYTES # BLD AUTO: 0.47 10*3/MM3 (ref 0.1–0.9)
MONOCYTES NFR BLD AUTO: 7.2 % (ref 5–12)
NEUTROPHILS NFR BLD AUTO: 4.26 10*3/MM3 (ref 1.7–7)
NEUTROPHILS NFR BLD AUTO: 65.1 % (ref 42.7–76)
NITRITE UR QL STRIP: NEGATIVE
NRBC BLD AUTO-RTO: 0 /100 WBC (ref 0–0.2)
PH UR STRIP.AUTO: 6 [PH] (ref 5–8)
PLATELET # BLD AUTO: 252 10*3/MM3 (ref 140–450)
PMV BLD AUTO: 10.9 FL (ref 6–12)
PROT UR QL STRIP: NEGATIVE
RBC # BLD AUTO: 4.04 10*6/MM3 (ref 3.77–5.28)
SP GR UR STRIP: 1.02 (ref 1–1.03)
UROBILINOGEN UR QL STRIP: NORMAL
WBC NRBC COR # BLD: 6.54 10*3/MM3 (ref 3.4–10.8)

## 2023-09-19 PROCEDURE — 83036 HEMOGLOBIN GLYCOSYLATED A1C: CPT

## 2023-09-19 PROCEDURE — 84443 ASSAY THYROID STIM HORMONE: CPT

## 2023-09-19 PROCEDURE — 81003 URINALYSIS AUTO W/O SCOPE: CPT

## 2023-09-19 PROCEDURE — 80061 LIPID PANEL: CPT

## 2023-09-19 PROCEDURE — 80050 GENERAL HEALTH PANEL: CPT

## 2023-09-19 PROCEDURE — 84439 ASSAY OF FREE THYROXINE: CPT

## 2023-09-19 PROCEDURE — 86803 HEPATITIS C AB TEST: CPT

## 2023-09-20 LAB
ALBUMIN SERPL-MCNC: 4.8 G/DL (ref 3.5–5.2)
ALBUMIN/GLOB SERPL: 1.8 G/DL
ALP SERPL-CCNC: 59 U/L (ref 39–117)
ALT SERPL W P-5'-P-CCNC: 13 U/L (ref 1–33)
ANION GAP SERPL CALCULATED.3IONS-SCNC: 11.6 MMOL/L (ref 5–15)
AST SERPL-CCNC: 17 U/L (ref 1–32)
BILIRUB SERPL-MCNC: 0.4 MG/DL (ref 0–1.2)
BUN SERPL-MCNC: 8 MG/DL (ref 6–20)
BUN/CREAT SERPL: 13.3 (ref 7–25)
CALCIUM SPEC-SCNC: 9.4 MG/DL (ref 8.6–10.5)
CHLORIDE SERPL-SCNC: 103 MMOL/L (ref 98–107)
CHOLEST SERPL-MCNC: 152 MG/DL (ref 0–200)
CO2 SERPL-SCNC: 23.4 MMOL/L (ref 22–29)
CREAT SERPL-MCNC: 0.6 MG/DL (ref 0.57–1)
EGFRCR SERPLBLD CKD-EPI 2021: 127.1 ML/MIN/1.73
GLOBULIN UR ELPH-MCNC: 2.7 GM/DL
GLUCOSE SERPL-MCNC: 81 MG/DL (ref 65–99)
HBA1C MFR BLD: 5 % (ref 4.8–5.6)
HCV AB SER DONR QL: NORMAL
HDLC SERPL-MCNC: 60 MG/DL (ref 40–60)
HOLD SPECIMEN: NORMAL
LDLC SERPL CALC-MCNC: 81 MG/DL (ref 0–100)
LDLC/HDLC SERPL: 1.35 {RATIO}
POTASSIUM SERPL-SCNC: 3.7 MMOL/L (ref 3.5–5.2)
PROT SERPL-MCNC: 7.5 G/DL (ref 6–8.5)
SODIUM SERPL-SCNC: 138 MMOL/L (ref 136–145)
T4 FREE SERPL-MCNC: 1.09 NG/DL (ref 0.93–1.7)
TRIGL SERPL-MCNC: 54 MG/DL (ref 0–150)
TSH SERPL DL<=0.05 MIU/L-ACNC: 1.36 UIU/ML (ref 0.27–4.2)
TSH SERPL DL<=0.05 MIU/L-ACNC: 1.36 UIU/ML (ref 0.27–4.2)
VLDLC SERPL-MCNC: 11 MG/DL (ref 5–40)

## 2023-09-20 NOTE — PROGRESS NOTES
Her complete lab work panel BMP CBC thyroid and cholesterol all within normal limits no new recommendation

## 2023-09-21 ENCOUNTER — TELEPHONE (OUTPATIENT)
Dept: FAMILY MEDICINE CLINIC | Facility: CLINIC | Age: 28
End: 2023-09-21
Payer: MEDICAID

## 2023-09-21 NOTE — TELEPHONE ENCOUNTER
Lvm to call office back.    HUB CAN RELY MESSAGE         --- Message from Jhon Ennis MD sent at 9/20/2023  4:26 PM EDT -----  Cholesterol panel and thyroid blood work very well within acceptable limits.       Jhon Ennis MD  9/20/2023  4:31 PM EDT Back to Top      Her complete lab work panel BMP CBC thyroid and cholesterol all within normal limits no new recommendation

## 2024-01-31 ENCOUNTER — OFFICE VISIT (OUTPATIENT)
Dept: FAMILY MEDICINE CLINIC | Facility: CLINIC | Age: 29
End: 2024-01-31
Payer: MEDICAID

## 2024-01-31 VITALS
DIASTOLIC BLOOD PRESSURE: 90 MMHG | OXYGEN SATURATION: 98 % | BODY MASS INDEX: 28.32 KG/M2 | HEIGHT: 65 IN | SYSTOLIC BLOOD PRESSURE: 122 MMHG | WEIGHT: 170 LBS | HEART RATE: 114 BPM

## 2024-01-31 DIAGNOSIS — S46.812A TRAPEZIUS STRAIN, LEFT, INITIAL ENCOUNTER: Primary | ICD-10-CM

## 2024-01-31 RX ORDER — METHYLPREDNISOLONE 4 MG/1
TABLET ORAL
Qty: 21 EACH | Refills: 0 | Status: SHIPPED | OUTPATIENT
Start: 2024-01-31

## 2024-01-31 NOTE — PROGRESS NOTES
Chief Complaint   Patient presents with    Shoulder Pain     LEFT SHOULDER BLADE PAIN   Pain Start about a week ago        HPI:  Makayla F Epley is a 29 y.o. female who presents today for left shoulder pain.    Pt reports pain in left shoulder blade x1 week. On Tuesday last week went to chiropractor for adjustment. On her way in she stubbed her toe and had pain so when she went to gym that night and did upper body work only. Her pain started 1-2 days later. Feels like it may have been a combination of these things that caused the injury. Describes pain as a burning sensation across the medial border and directly underneath the scapular. Has tried muscle relaxer, NSAIDs, Tylenol w/o relief. No associated weakness, numbness, tingling. Pain does not radiate.     PE:  Vitals:    01/31/24 1033   BP: 122/90   Pulse: 114   SpO2: 98%      Body mass index is 28.29 kg/m².    Gen Appearance: NAD  HEENT: Normocephalic, EOMI  Lungs: Normal WOB  MSK: LUE: Normal ROM. No gross bony deformity. There is no cervical vertebral TTP. There is muscular spasm noted along the left trapezius. Negative spurlings.   Neuro: No focal deficits    Current Outpatient Medications   Medication Sig Dispense Refill    methocarbamol (ROBAXIN) 750 MG tablet Take 1 tablet by mouth Daily As Needed for Muscle Spasms. 30 tablet 1    methylPREDNISolone (MEDROL) 4 MG dose pack Take as directed on package instructions. 21 each 0     No current facility-administered medications for this visit.        A/P:  Diagnoses and all orders for this visit:    1. Trapezius strain, left, initial encounter (Primary)  -     methylPREDNISolone (MEDROL) 4 MG dose pack; Take as directed on package instructions.  Dispense: 21 each; Refill: 0    Likely muscular strain, exam reassuring. Trial medrol dose pack. Advised to avoid NSAIDs while on steroids. Can supplement w muscle relaxer and Tylenol as needed. Alt hot/cold packs.   Advised activity modification until pain improves.  Then begin gentle stretching/ROM  FU if sx's fail to improve in 2-3 weeks.    Dictated Utilizing Dragon Dictation    Please note that portions of this note were completed with a voice recognition program.    Part of this note may be an electronic transcription/translation of spoken language to printed text using the Dragon Dictation System.

## 2024-02-13 ENCOUNTER — OFFICE VISIT (OUTPATIENT)
Dept: FAMILY MEDICINE CLINIC | Facility: CLINIC | Age: 29
End: 2024-02-13
Payer: MEDICAID

## 2024-02-13 ENCOUNTER — HOSPITAL ENCOUNTER (OUTPATIENT)
Dept: GENERAL RADIOLOGY | Facility: HOSPITAL | Age: 29
Discharge: HOME | End: 2024-02-13
Admitting: STUDENT IN AN ORGANIZED HEALTH CARE EDUCATION/TRAINING PROGRAM
Payer: MEDICAID

## 2024-02-13 VITALS
OXYGEN SATURATION: 98 % | HEIGHT: 65 IN | HEART RATE: 85 BPM | SYSTOLIC BLOOD PRESSURE: 136 MMHG | DIASTOLIC BLOOD PRESSURE: 70 MMHG | WEIGHT: 171.2 LBS | BODY MASS INDEX: 28.52 KG/M2

## 2024-02-13 DIAGNOSIS — M25.512 ACUTE PAIN OF LEFT SHOULDER: ICD-10-CM

## 2024-02-13 DIAGNOSIS — M54.2 NECK PAIN: ICD-10-CM

## 2024-02-13 DIAGNOSIS — S46.812A TRAPEZIUS STRAIN, LEFT, INITIAL ENCOUNTER: ICD-10-CM

## 2024-02-13 DIAGNOSIS — M25.512 ACUTE PAIN OF LEFT SHOULDER: Primary | ICD-10-CM

## 2024-02-13 PROCEDURE — 72040 X-RAY EXAM NECK SPINE 2-3 VW: CPT

## 2024-02-13 RX ORDER — METHOCARBAMOL 750 MG/1
750 TABLET, FILM COATED ORAL DAILY PRN
Qty: 30 TABLET | Refills: 1 | Status: SHIPPED | OUTPATIENT
Start: 2024-02-13

## 2024-02-13 RX ORDER — DICLOFENAC SODIUM 75 MG/1
75 TABLET, DELAYED RELEASE ORAL 2 TIMES DAILY
Qty: 60 TABLET | Refills: 1 | Status: SHIPPED | OUTPATIENT
Start: 2024-02-13 | End: 2024-02-14

## 2024-02-14 ENCOUNTER — OFFICE VISIT (OUTPATIENT)
Dept: FAMILY MEDICINE CLINIC | Facility: CLINIC | Age: 29
End: 2024-02-14
Payer: MEDICAID

## 2024-02-14 VITALS
HEIGHT: 65 IN | RESPIRATION RATE: 14 BRPM | HEART RATE: 109 BPM | DIASTOLIC BLOOD PRESSURE: 64 MMHG | SYSTOLIC BLOOD PRESSURE: 124 MMHG | WEIGHT: 171.4 LBS | OXYGEN SATURATION: 99 % | BODY MASS INDEX: 28.56 KG/M2

## 2024-02-14 DIAGNOSIS — T78.40XA ALLERGIC REACTION, INITIAL ENCOUNTER: Primary | ICD-10-CM

## 2024-02-14 RX ORDER — PREDNISONE 20 MG/1
TABLET ORAL
Qty: 9 TABLET | Refills: 0 | Status: SHIPPED | OUTPATIENT
Start: 2024-02-14 | End: 2024-02-21

## 2024-06-25 ENCOUNTER — TELEPHONE (OUTPATIENT)
Dept: OBSTETRICS AND GYNECOLOGY | Facility: CLINIC | Age: 29
End: 2024-06-25
Payer: MEDICAID

## 2024-06-25 ENCOUNTER — TELEPHONE (OUTPATIENT)
Dept: FAMILY MEDICINE CLINIC | Facility: CLINIC | Age: 29
End: 2024-06-25

## 2024-06-25 NOTE — TELEPHONE ENCOUNTER
She would like STI blood panel ordered. She denies symptoms and concern for infection. She has annual exam scheduled 8/22/2024. Patient informed labs can be performed when she comes in for annual. She requested to change annual to sooner date. 6/27/2024 annual scheduled.

## 2024-06-25 NOTE — TELEPHONE ENCOUNTER
Caller: Epley, Makayla F    Relationship: Self    Best call back number: [phone]     What is the medical concern/diagnosis: RECURRING HEMORRHOID     What specialty or service is being requested: COLORECTAL    What is the provider, practice or medical service name: AS CHOSEN BY [facility]IMARY [facility]ARE

## 2024-06-25 NOTE — TELEPHONE ENCOUNTER
Caller: Epley, Makayla F    Relationship: Self    Best call back number: [phone] / LVM    What orders are you requesting (i.e. lab or imaging): LABS    In what timeframe would the patient need to come in: ASAP    Where will you receive your lab/imaging services: [facility]      Additional notes: PT IS WANTING TO COME IN TO DO A FULL PANEL LABS - PLEASE LET THE PT KNOW IF THIS REQ UEST CAN BE MADE    THANK YOU!

## 2024-06-26 DIAGNOSIS — K64.9 HEMORRHOIDS, UNSPECIFIED HEMORRHOID TYPE: Primary | ICD-10-CM

## 2024-07-05 ENCOUNTER — OFFICE VISIT (OUTPATIENT)
Dept: FAMILY MEDICINE CLINIC | Facility: CLINIC | Age: 29
End: 2024-07-05
Payer: MEDICAID

## 2024-07-05 VITALS
WEIGHT: 160 LBS | HEART RATE: 80 BPM | OXYGEN SATURATION: 97 % | DIASTOLIC BLOOD PRESSURE: 78 MMHG | HEIGHT: 65 IN | BODY MASS INDEX: 26.66 KG/M2 | SYSTOLIC BLOOD PRESSURE: 126 MMHG

## 2024-07-05 DIAGNOSIS — F41.9 ANXIETY: ICD-10-CM

## 2024-07-05 DIAGNOSIS — F33.1 MAJOR DEPRESSIVE DISORDER, RECURRENT EPISODE, MODERATE DEGREE: Primary | ICD-10-CM

## 2024-07-05 PROCEDURE — 1160F RVW MEDS BY RX/DR IN RCRD: CPT | Performed by: NURSE PRACTITIONER

## 2024-07-05 PROCEDURE — 1125F AMNT PAIN NOTED PAIN PRSNT: CPT | Performed by: NURSE PRACTITIONER

## 2024-07-05 PROCEDURE — 99214 OFFICE O/P EST MOD 30 MIN: CPT | Performed by: NURSE PRACTITIONER

## 2024-07-05 PROCEDURE — 1159F MED LIST DOCD IN RCRD: CPT | Performed by: NURSE PRACTITIONER

## 2024-07-05 RX ORDER — DESVENLAFAXINE 25 MG/1
25 TABLET, EXTENDED RELEASE ORAL DAILY
Qty: 30 TABLET | Refills: 1 | Status: SHIPPED | OUTPATIENT
Start: 2024-07-05

## 2024-07-05 NOTE — PROGRESS NOTES
"Subjective   Makayla F Epley is a 29 y.o. female.   Chief Complaint   Patient presents with    Depression     Pt. States she feels worse since last visit. X a few months now.       DepressionSymptoms include nervousness/anxiety. Patient is not experiencing: suicidal ideas.  Her past medical history is significant for depression.      Patient of Dr. Ennis is here with complaint of depression and anxiety, this has been going on since late March, is in nursing school, kept thinking things would get better.  Has had issues with depression and anxiety before, but always able to \"pull\" herself out of it.  Denies thoughts of hurting herself, no Suicide ideation  The following portions of the patient's history were reviewed and updated as appropriate: allergies, current medications, past family history, past medical history, past social history, past surgical history, and problem list.    Review of Systems   Psychiatric/Behavioral:  Positive for dysphoric mood. Negative for self-injury and suicidal ideas. The patient is nervous/anxious.        Objective   Physical Exam  Vitals reviewed.   Constitutional:       Appearance: Normal appearance.   Neurological:      Mental Status: She is alert.   Psychiatric:         Attention and Perception: Attention normal.         Mood and Affect: Mood is depressed. Mood is not anxious. Affect is tearful.         Speech: Speech normal.         Behavior: Behavior normal. Behavior is cooperative.         Thought Content: Thought content is not paranoid or delusional. Thought content does not include homicidal or suicidal ideation. Thought content does not include homicidal or suicidal plan.       /78   Pulse 80   Ht 165.1 cm (65\")   Wt 72.6 kg (160 lb)   SpO2 97%   BMI 26.63 kg/m²     Assessment & Plan   Diagnoses and all orders for this visit:    1. Major depressive disorder, recurrent episode, moderate degree (Primary)  -     Ambulatory Referral to Behavioral Health  -     " Desvenlafaxine Succinate ER 25 MG tablet sustained-release 24 hour; Take 1 tablet by mouth Daily.  Dispense: 30 tablet; Refill: 1    2. Anxiety  -     Ambulatory Referral to Behavioral Health  -     Desvenlafaxine Succinate ER 25 MG tablet sustained-release 24 hour; Take 1 tablet by mouth Daily.  Dispense: 30 tablet; Refill: 1        PHQ-9 Depression Screening  Little interest or pleasure in doing things? 3-->nearly every day   Feeling down, depressed, or hopeless? 3-->nearly every day   Trouble falling or staying asleep, or sleeping too much? 3-->nearly every day   Feeling tired or having little energy? 3-->nearly every day   Poor appetite or overeating? 2-->more than half the days   Feeling bad about yourself - or that you are a failure or have let yourself or your family down? 3-->nearly every day   Trouble concentrating on things, such as reading the newspaper or watching television? 2-->more than half the days   Moving or speaking so slowly that other people could have noticed? Or the opposite - being so fidgety or restless that you have been moving around a lot more than usual? 1-->several days   Thoughts that you would be better off dead, or of hurting yourself in some way? 1-->several days   PHQ-9 Total Score 21   If you checked off any problems, how difficult have these problems made it for you to do your work, take care of things at home, or get along with other people? very difficult     KATHY 7 = 19  Will try Pristiq; follow up with Dr. Ennis in 1 month for repeat depression and anxiety screens. Referred to behavioral Health for therapy  Patient was encouraged to keep me informed of any acute changes, lack of improvement, or any new concerning symptoms.

## 2024-07-10 ENCOUNTER — TELEPHONE (OUTPATIENT)
Dept: OBSTETRICS AND GYNECOLOGY | Facility: CLINIC | Age: 29
End: 2024-07-10
Payer: MEDICAID

## 2024-07-10 NOTE — TELEPHONE ENCOUNTER
Pt not pregnant. Concerned about this period with increased blood clots as this is not her normal pattern. Today bleeding is light and back pain feels similar to period cramps. Advised ibuprofen 600 mg every 6 hours if needed. If bleeding becomes severe, soaking through pads in < 30 minutes or passing clots larger than palm, advised pt to call back or go to ER for evaluation.

## 2024-07-10 NOTE — TELEPHONE ENCOUNTER
Provider: DR. THORNTON    Caller: MAKAYLA EPLEY    Relationship to Patient: SELF    Pharmacy:     Phone Number: [phone]    Reason for Call: PT LMP IS 6-4, BUT STARTED SPOTTING 7-8 THEN YESTERDAY PASSED 7-8 LARGE CLOTS. PT NOT SURE IF THERE IS AN ISSUE, HAD BACK CRAMPS AT THE TIME.SAYS IT IS SORE NOW STILL.(PT ADV SHE HAD BACK INJ 2022 BUT USUALLY THAT HURTS IN THE TOP OF HER BACK NOT LOWER).    When was the patient last seen: 07-12-23

## 2024-07-22 ENCOUNTER — OFFICE VISIT (OUTPATIENT)
Dept: OBSTETRICS AND GYNECOLOGY | Facility: CLINIC | Age: 29
End: 2024-07-22
Payer: MEDICAID

## 2024-07-22 VITALS
SYSTOLIC BLOOD PRESSURE: 126 MMHG | WEIGHT: 157 LBS | HEIGHT: 65 IN | DIASTOLIC BLOOD PRESSURE: 84 MMHG | BODY MASS INDEX: 26.16 KG/M2

## 2024-07-22 DIAGNOSIS — K64.4 EXTERNAL HEMORRHOIDS: ICD-10-CM

## 2024-07-22 DIAGNOSIS — Z01.419 ENCOUNTER FOR ANNUAL ROUTINE GYNECOLOGICAL EXAMINATION: Primary | ICD-10-CM

## 2024-07-22 DIAGNOSIS — Z11.3 SCREENING EXAMINATION FOR STD (SEXUALLY TRANSMITTED DISEASE): ICD-10-CM

## 2024-07-22 PROCEDURE — 2014F MENTAL STATUS ASSESS: CPT | Performed by: NURSE PRACTITIONER

## 2024-07-22 PROCEDURE — 99459 PELVIC EXAMINATION: CPT | Performed by: NURSE PRACTITIONER

## 2024-07-22 PROCEDURE — 99395 PREV VISIT EST AGE 18-39: CPT | Performed by: NURSE PRACTITIONER

## 2024-07-22 PROCEDURE — 1160F RVW MEDS BY RX/DR IN RCRD: CPT | Performed by: NURSE PRACTITIONER

## 2024-07-22 PROCEDURE — 1159F MED LIST DOCD IN RCRD: CPT | Performed by: NURSE PRACTITIONER

## 2024-07-22 RX ORDER — HYDROCORTISONE 25 MG/G
CREAM TOPICAL 2 TIMES DAILY
Qty: 28 EACH | Refills: 1 | Status: SHIPPED | OUTPATIENT
Start: 2024-07-22

## 2024-07-22 NOTE — PROGRESS NOTES
Gynecologic Annual Exam Note        Annual Exam and STD testing        Subjective     HPI  Makayla F Epley is a 29 y.o.  female who presents for annual well woman exam as a established patient. There were no changes to her medical or surgical history since her last visit.. Patient's last menstrual period was 07/15/2024. Her periods occur every 28-30 days, lasting 5-6 days.  The flow is heavy-light. She reports dysmenorrhea is severe occurring first 1-2 days of flow.     Marital Status: single.  She is sexually active. She has had new partners. STD testing recommendations have been explained to the patient and she does desire STD testing with labs.    The patient would like to discuss the following complaints today: none    Additional OB/GYN History   contraceptive methods: Condoms  Desires to: do not start contraception  Thromboembolic Disease: none  History of migraines: no  Age of menarche: 15    History of STD: yes  trich on 6/15/2023    Last Pap : 2023. Results: negative. HPV:  not done . STD testing: negative   Last Completed Pap Smear            Ordered - PAP SMEAR (Every 3 Years) Ordered on 2023  LIQUID-BASED PAP SMEAR, [facility] ([facility])                     History of abnormal Pap smear: no  Gardasil status:completed  Family history of uterine, colon, breast, or ovarian cancer: yes - MGM had breast cancer  Performs monthly Self-Breast Exam: yes  Exercises Regularly:yes  Feelings of Anxiety or Depression: yes - both  Tobacco Usage?: No       Current Outpatient Medications:     Desvenlafaxine Succinate ER 25 MG tablet sustained-release 24 hour, Take 1 tablet by mouth Daily., Disp: 30 tablet, Rfl: 1    methocarbamol (ROBAXIN) 750 MG tablet, Take 1 tablet by mouth Daily As Needed for Muscle Spasms., Disp: 30 tablet, Rfl: 1    Hydrocortisone, Perianal, (Proctosol HC) 2.5 % rectal cream, Insert  into the rectum 2 (Two) Times a Day., Disp: 28 each, Rfl: 1     Patient  "denies the need for medication refills today.    OB History          0    Para   0    Term   0       0    AB   0    Living   0         SAB   0    IAB   0    Ectopic   0    Molar   0    Multiple   0    Live Births   0                Health Maintenance   Topic Date Due    COVID-19 Vaccine (3 - 2023-24 season) 2023    ANNUAL PHYSICAL  12/15/2023    Annual Gynecologic Pelvic and Breast Exam  2024    INFLUENZA VACCINE  2024    BMI FOLLOWUP  2025    PAP SMEAR  2026    TDAP/TD VACCINES (3 - Td or Tdap) 2031    HEPATITIS C SCREENING  Completed    Pneumococcal Vaccine 0-64  Aged Out    CHLAMYDIA SCREENING  Discontinued       Past Medical History:   Diagnosis Date    Anxiety     Depression     Urogenital trichomoniasis 06/15/2023        Past Surgical History:   Procedure Laterality Date    WISDOM TOOTH EXTRACTION         The additional following portions of the patient's history were reviewed and updated as appropriate: allergies, current medications, past family history, past medical history, past social history, past surgical history, and problem list.    Review of Systems   Constitutional: Negative.    HENT: Negative.     Eyes: Negative.    Respiratory: Negative.     Cardiovascular: Negative.    Gastrointestinal: Negative.    Endocrine: Negative.    Genitourinary: Negative.    Musculoskeletal: Negative.    Skin: Negative.    Allergic/Immunologic: Negative.    Neurological: Negative.    Hematological: Negative.    Psychiatric/Behavioral: Negative.           I have reviewed and agree with the HPI, ROS, and historical information as entered above. Myrna Nova, APRN          Objective   /84   Ht 165.1 cm (65\")   Wt 71.2 kg (157 lb)   LMP 07/15/2024   Breastfeeding No   BMI 26.13 kg/m²     Physical Exam  Vitals and nursing note reviewed. Exam conducted with a chaperone present.   Constitutional:       Appearance: Normal appearance. She is well-developed. "   HENT:      Head: Normocephalic and atraumatic.   Neck:      Thyroid: No thyroid mass or thyromegaly.   Cardiovascular:      Rate and Rhythm: Normal rate.      Heart sounds: No murmur heard.  Pulmonary:      Effort: Pulmonary effort is normal. No retractions.      Breath sounds: No wheezing, rhonchi or rales.   Chest:      Chest wall: No mass or tenderness.   Breasts:     Right: Normal. No mass, nipple discharge, skin change or tenderness.      Left: Normal. No mass, nipple discharge, skin change or tenderness.   Abdominal:      Palpations: Abdomen is soft. Abdomen is not rigid. There is no mass.      Tenderness: There is no abdominal tenderness. There is no guarding.      Hernia: No hernia is present.   Genitourinary:     General: Normal vulva.      Exam position: Lithotomy position.      Labia:         Right: No rash, tenderness or lesion.         Left: No rash, tenderness or lesion.       Vagina: Normal. No vaginal discharge or lesions.      Cervix: No cervical motion tenderness, discharge, lesion or cervical bleeding.      Uterus: Normal. Not enlarged, not fixed and not tender.       Adnexa: Right adnexa normal and left adnexa normal.        Right: No mass or tenderness.          Left: No mass or tenderness.        Rectum: External hemorrhoid present.   Musculoskeletal:      Cervical back: Normal range of motion. No muscular tenderness.   Neurological:      Mental Status: She is alert and oriented to person, place, and time.   Psychiatric:         Behavior: Behavior normal.            Assessment and Plan    Problem List Items Addressed This Visit    None  Visit Diagnoses       Encounter for annual routine gynecological examination    -  Primary    Relevant Orders    LIQUID-BASED PAP SMEAR WITH HPV GENOTYPING IF ASCUS ([facility])    Screening examination for STD (sexually transmitted disease)        Relevant Orders    LIQUID-BASED PAP SMEAR WITH HPV GENOTYPING IF ASCUS ([facility])    HIV-1 / O / 2 Ag /  Antibody    Hepatitis Panel, Acute    RPR    External hemorrhoids        Relevant Medications    Hydrocortisone, Perianal, (Proctosol HC) 2.5 % rectal cream            GYN annual well woman exam.   Reviewed pap guidelines. Pap today with STI testing added.  STI Labs today  Hemorrhoids: proctosol cream RX. She has upcoming appt with her colorectal specialist to discuss further.  Encouraged use of condoms for STD prevention.  Fibrocystic breast changes - Encouraged decreasing caffeine, supportive bra, low dose vitamin E supplementation.  Reviewed monthly self breast exams.  Instructed to call with lumps, pain, or breast discharge.    Reviewed exercise as a preventative health measures.   Reccommended Flu Vaccine in Fall of each year.  RTC in 1 year or PRN with problems  Return in about 1 year (around 7/22/2025) for Annual physical.    Myrna Nova, APRN  07/22/2024

## 2024-07-23 LAB
HAV IGM SERPL QL IA: NEGATIVE
HBV CORE IGM SERPL QL IA: NEGATIVE
HBV SURFACE AG SERPL QL IA: NEGATIVE
HCV AB SERPL QL IA: NORMAL
HCV IGG SERPL QL IA: NON REACTIVE
HIV 1+2 AB+HIV1 P24 AG SERPL QL IA: NON REACTIVE
RPR SER QL: NON REACTIVE

## 2024-07-30 LAB — REF LAB TEST METHOD: NORMAL

## 2024-08-09 ENCOUNTER — OFFICE VISIT (OUTPATIENT)
Dept: FAMILY MEDICINE CLINIC | Facility: CLINIC | Age: 29
End: 2024-08-09
Payer: MEDICAID

## 2024-08-09 VITALS
SYSTOLIC BLOOD PRESSURE: 124 MMHG | WEIGHT: 159.2 LBS | OXYGEN SATURATION: 99 % | BODY MASS INDEX: 26.52 KG/M2 | HEIGHT: 65 IN | HEART RATE: 69 BPM | DIASTOLIC BLOOD PRESSURE: 78 MMHG

## 2024-08-09 DIAGNOSIS — F33.1 MAJOR DEPRESSIVE DISORDER, RECURRENT EPISODE, MODERATE DEGREE: ICD-10-CM

## 2024-08-09 DIAGNOSIS — F41.9 ANXIETY: ICD-10-CM

## 2024-08-09 DIAGNOSIS — G43.809 OTHER MIGRAINE WITHOUT STATUS MIGRAINOSUS, NOT INTRACTABLE: Primary | ICD-10-CM

## 2024-08-09 PROCEDURE — 1125F AMNT PAIN NOTED PAIN PRSNT: CPT | Performed by: INTERNAL MEDICINE

## 2024-08-09 PROCEDURE — 99214 OFFICE O/P EST MOD 30 MIN: CPT | Performed by: INTERNAL MEDICINE

## 2024-08-09 RX ORDER — DESVENLAFAXINE SUCCINATE 50 MG/1
50 TABLET, EXTENDED RELEASE ORAL DAILY
Qty: 90 TABLET | Refills: 1 | Status: SHIPPED | OUTPATIENT
Start: 2024-08-09

## 2024-08-09 RX ORDER — SUMATRIPTAN 100 MG/1
TABLET, FILM COATED ORAL
Qty: 9 TABLET | Refills: 2 | Status: SHIPPED | OUTPATIENT
Start: 2024-08-09

## 2024-08-09 RX ORDER — DESVENLAFAXINE 25 MG/1
25 TABLET, EXTENDED RELEASE ORAL DAILY
Qty: 30 TABLET | Refills: 1 | Status: SHIPPED | OUTPATIENT
Start: 2024-08-09 | End: 2024-08-09

## 2024-08-09 NOTE — PROGRESS NOTES
"Makayla F Epley  1995  [id number]  Patient Care Team:  Jhon Ennis MD as PCP - General (Internal Medicine)    Makayla F Epley is a 29 y.o. female here today for follow up.     This patient is accompanied by their self who contributes to the history of their care.    Chief Complaint:    Chief Complaint   Patient presents with    Depression     F/U  about the same, maybe a little better.         History of Present Illness:  I have reviewed and/or updated the patient's past medical, past surgical, family, social history, problem list and allergies as appropriate.     Se anderson slight improvement in her depression and anxiety  on venlafaxine.  She has had a very bad year with a marriage that was actually called off.  Since being on Pristiq, has been a slight increase in headaches frequency. No SI/HI. Sleep has improved. She describes her headache is pressure in occiput sinus region. Occastional nausea. Has not been rx'd triptans. He has had botox in the past.. sh emay get 4-6 h/a per month.  Denies any focal weakness numbness or tingling    Review of Systems   Neurological:  Positive for headache.   Psychiatric/Behavioral:  Positive for depressed mood. Negative for sleep disturbance. The patient is nervous/anxious.        Vitals:    08/09/24 1254   BP: 124/78   Pulse: 69   SpO2: 99%   Weight: 72.2 kg (159 lb 3.2 oz)   Height: 165.1 cm (65\")     Body mass index is 26.49 kg/m².    Physical Exam  Vitals and nursing note reviewed.   Constitutional:       General: She is not in acute distress.     Appearance: She is well-developed. She is not diaphoretic.   HENT:      Head: Normocephalic and atraumatic.      Right Ear: External ear normal.      Left Ear: External ear normal.      Mouth/Throat:      Pharynx: No oropharyngeal exudate.   Eyes:      General: No scleral icterus.        Right eye: No discharge.      Conjunctiva/sclera: Conjunctivae normal.   Neck:      Thyroid: No thyromegaly.      Vascular: No JVD. "      Trachea: No tracheal deviation.   Cardiovascular:      Rate and Rhythm: Normal rate and regular rhythm.      Heart sounds: Normal heart sounds.      Comments: PMI nondisplaced  Pulmonary:      Effort: Pulmonary effort is normal.      Breath sounds: Normal breath sounds. No wheezing or rales.   Abdominal:      General: Bowel sounds are normal.      Palpations: Abdomen is soft.      Tenderness: There is no abdominal tenderness. There is no guarding or rebound.   Musculoskeletal:      Cervical back: Normal range of motion and neck supple.   Lymphadenopathy:      Cervical: No cervical adenopathy.   Skin:     General: Skin is warm and dry.      Capillary Refill: Capillary refill takes less than 2 seconds.      Coloration: Skin is not pale.      Findings: No rash.   Neurological:      Mental Status: She is alert and oriented to person, place, and time.      Motor: No abnormal muscle tone.      Coordination: Coordination normal.   Psychiatric:         Judgment: Judgment normal.         Procedures    Results Review:    None    Assessment/Plan:    Problem List Items Addressed This Visit    None  Visit Diagnoses       Other migraine without status migrainosus, not intractable    -  Primary    Relevant Medications    SUMAtriptan (Imitrex) 100 MG tablet    Desvenlafaxine Succinate ER (PRISTIQ) 50 MG 24 hr tablet    Major depressive disorder, recurrent episode, moderate degree        Relevant Medications    Desvenlafaxine Succinate ER (PRISTIQ) 50 MG 24 hr tablet    Other Relevant Orders    Ambulatory Referral to Behavioral Health    Anxiety        Relevant Medications    Desvenlafaxine Succinate ER (PRISTIQ) 50 MG 24 hr tablet    Other Relevant Orders    Ambulatory Referral to Behavioral Health        Journal her headaches.  Will try to get her into counseling sooner otherwise she will be faced with starting school and will seek counseling at her University.    Plan of care reviewed with patient at the conclusion of  today's visit. Education was provided regarding diagnosis and management.  Patient verbalizes understanding of and agreement with management plan.    Return in about 2 months (around 10/9/2024) for pristiq.    Jhon Ennis MD      Please note than portions of this note were completed wth a Voice Recognition Program

## 2024-10-21 DIAGNOSIS — M25.512 ACUTE PAIN OF LEFT SHOULDER: ICD-10-CM

## 2024-10-21 RX ORDER — METHOCARBAMOL 750 MG/1
750 TABLET, FILM COATED ORAL DAILY PRN
Qty: 90 TABLET | Refills: 1 | Status: SHIPPED | OUTPATIENT
Start: 2024-10-21

## 2024-11-18 ENCOUNTER — OFFICE VISIT (OUTPATIENT)
Dept: OBSTETRICS AND GYNECOLOGY | Facility: CLINIC | Age: 29
End: 2024-11-18
Payer: MEDICAID

## 2024-11-18 VITALS
BODY MASS INDEX: 25.96 KG/M2 | HEIGHT: 65 IN | WEIGHT: 155.8 LBS | DIASTOLIC BLOOD PRESSURE: 82 MMHG | SYSTOLIC BLOOD PRESSURE: 122 MMHG

## 2024-11-18 DIAGNOSIS — Z30.015 ENCOUNTER FOR INITIAL PRESCRIPTION OF VAGINAL RING HORMONAL CONTRACEPTIVE: ICD-10-CM

## 2024-11-18 DIAGNOSIS — Z20.2 POSSIBLE EXPOSURE TO STD: Primary | ICD-10-CM

## 2024-11-18 DIAGNOSIS — Z30.09 ENCOUNTER FOR COUNSELING REGARDING CONTRACEPTION: ICD-10-CM

## 2024-11-18 PROCEDURE — 1159F MED LIST DOCD IN RCRD: CPT

## 2024-11-18 PROCEDURE — 99213 OFFICE O/P EST LOW 20 MIN: CPT

## 2024-11-18 PROCEDURE — 1160F RVW MEDS BY RX/DR IN RCRD: CPT

## 2024-11-18 PROCEDURE — 99459 PELVIC EXAMINATION: CPT

## 2024-11-18 RX ORDER — NYSTATIN 100000 U/G
CREAM TOPICAL
COMMUNITY
Start: 2024-09-23

## 2024-11-18 RX ORDER — SEGESTERONE ACETATE AND ETHINYL ESTRADIOL 103; 17.4 MG/1; MG/1
1 RING VAGINAL TAKE AS DIRECTED
Qty: 1 EACH | Refills: 0 | Status: SHIPPED | OUTPATIENT
Start: 2024-11-18

## 2024-11-18 NOTE — PROGRESS NOTES
Chief Complaint   Patient presents with    Follow-up     STD testing/Discuss birth control          Subjective   HPI  Makayla F Epley is a 29 y.o. female, , who presents for screening for STD's and discuss birth control.    Patient's last menstrual period was 2024.. She reports sexual contact with individual with uncertain background 2 months ago. The patient denies  any symptoms, but reports new sex partner and would like to be tested .  She is requesting STD testing with blood work. The patient reports a past history of: chlamydia, gonorrhea, and trichomonas.    She would like to discuss birth control options. She states that she has the Nexplanon previously and did not do well with it.      Additional OB/GYN History   Last Pap :   Last Completed Pap Smear            PAP SMEAR (Every 3 Years) Next due on 2024  LIQUID-BASED PAP SMEAR WITH HPV GENOTYPING IF ASCUS ([facility])    2023  LIQUID-BASED PAP SMEAR, [facility] ([facility])                  History of abnormal Pap smear: yes - ASCUS  OB History          0    Para   0    Term   0       0    AB   0    Living   0         SAB   0    IAB   0    Ectopic   0    Molar   0    Multiple   0    Live Births   0                  Current Outpatient Medications:     Desvenlafaxine Succinate ER (PRISTIQ) 50 MG 24 hr tablet, Take 1 tablet by mouth Daily., Disp: 90 tablet, Rfl: 1    Hydrocortisone, Perianal, (Proctosol HC) 2.5 % rectal cream, Insert  into the rectum 2 (Two) Times a Day., Disp: 28 each, Rfl: 1    methocarbamol (ROBAXIN) 750 MG tablet, Take 1 tablet by mouth Daily As Needed for Muscle Spasms., Disp: 90 tablet, Rfl: 1    nystatin (MYCOSTATIN) 793307 UNIT/GM cream, APPLY A THIN LAYER TO THE AFFECTED AREA TWICE DAILY, Disp: , Rfl:     SUMAtriptan (Imitrex) 100 MG tablet, Take one tablet at onset of headache. May repeat dose one time in 2 hours if headache not relieved., Disp: 9 tablet, Rfl: 2     "Segesterone-Ethinyl Estradiol (Annovera) 0.15-0.013 MG/24HR ring, Insert 1 Ring into the vagina Take As Directed. for three weeks, remove for 1 week, then repeat, Disp: 1 each, Rfl: 0     Past Medical History:   Diagnosis Date    Anxiety     Depression     Urogenital trichomoniasis 06/15/2023        Past Surgical History:   Procedure Laterality Date    WISDOM TOOTH EXTRACTION         The additional following portions of the patient's history were reviewed and updated as appropriate: allergies, current medications, past family history, past medical history, past social history, past surgical history, and problem list.    Review of Systems   Respiratory: Negative.  Negative for shortness of breath.    Cardiovascular: Negative.  Negative for chest pain.   Gastrointestinal: Negative.  Negative for abdominal distention, abdominal pain and constipation.   Genitourinary: Negative.  Negative for dyspareunia, dysuria, menstrual problem, pelvic pain, pelvic pressure, urinary incontinence, vaginal bleeding, vaginal discharge and vaginal pain.     All other systems reviewed and are negative.     I have reviewed and agree with the HPI, ROS, and historical information as entered above. Griselda Ashford, APRN      Objective   /82   Ht 165.1 cm (65\")   Wt 70.7 kg (155 lb 12.8 oz)   LMP 11/11/2024   BMI 25.93 kg/m²     Physical Exam  Vitals and nursing note reviewed. Exam conducted with a chaperone present.   Constitutional:       General: She is not in acute distress.     Appearance: Normal appearance. She is not ill-appearing or toxic-appearing.   HENT:      Head: Normocephalic and atraumatic.   Pulmonary:      Effort: Pulmonary effort is normal.   Abdominal:      Palpations: Abdomen is soft. There is no mass.      Tenderness: There is no abdominal tenderness.      Hernia: No hernia is present.   Genitourinary:     General: Normal vulva.      Labia:         Right: No rash, tenderness, lesion or injury.         Left: No " rash, tenderness, lesion or injury.       Vagina: No signs of injury. No vaginal discharge, erythema, tenderness, bleeding or lesions.      Cervix: Normal. No cervical motion tenderness, discharge, friability, lesion, erythema or cervical bleeding.      Uterus: Normal. Not enlarged and not tender.       Adnexa: Right adnexa normal and left adnexa normal.        Right: No mass or tenderness.          Left: No mass or tenderness.     Neurological:      Mental Status: She is alert and oriented to person, place, and time.   Psychiatric:         Mood and Affect: Mood normal.         Behavior: Behavior normal.         Assessment & Plan     Assessment and Plan    Problem List Items Addressed This Visit    None  Visit Diagnoses       Possible exposure to STD    -  Primary    Relevant Orders    RPR, Rfx Qn RPR / Confirm TP    Hepatitis B Surface Antigen    Hepatitis C Antibody    HIV-1 / O / 2 Ag / Antibody 4th Generation    Chlamydia trachomatis, Neisseria gonorrhoeae, Trichomonas vaginalis, PCR - Swab, Cervix    Encounter for initial prescription of vaginal ring hormonal contraceptive        Relevant Medications    Segesterone-Ethinyl Estradiol (Annovera) 0.15-0.013 MG/24HR ring    Encounter for counseling regarding contraception              Plan:   Contraception choices reviewed.   She desires ring for contraception. Benefits, risks, and potential se reviewed with patient. She denies contraindication to using estrogen containing contraception. Advised no unprotected IC x 2 weeks. Take a UPT, if neg insert vaginal ring as directed. [facility]   Discussed safe sexual practice in detail. Nuswab and blood work pending. Encouraged condom use.   Discussed HSV issues in detail. She will call for appt if genital lesion presents.   Annovera and preventing STI education included in patient instructions.   Return for Annual physical or sooner if needed.          Griselda Ashford, APRN  11/18/2024

## 2024-11-19 LAB
C TRACH RRNA SPEC QL NAA+PROBE: NEGATIVE
HBV SURFACE AG SERPL QL IA: NEGATIVE
HCV IGG SERPL QL IA: NON REACTIVE
HIV 1+2 AB+HIV1 P24 AG SERPL QL IA: NON REACTIVE
N GONORRHOEA RRNA SPEC QL NAA+PROBE: NEGATIVE
RPR SER QL: NON REACTIVE
T VAGINALIS RRNA SPEC QL NAA+PROBE: NEGATIVE

## 2024-12-10 ENCOUNTER — TELEPHONE (OUTPATIENT)
Dept: OBSTETRICS AND GYNECOLOGY | Facility: CLINIC | Age: 29
End: 2024-12-10
Payer: MEDICAID

## 2024-12-10 NOTE — TELEPHONE ENCOUNTER
[facility] sent refill request for Annovera last filled 11/28/2024. This rx is good for 364 days. No more refills should be needed at this time.

## 2024-12-16 RX ORDER — FAMCICLOVIR 250 MG/1
250 TABLET ORAL 2 TIMES DAILY
Qty: 20 TABLET | Refills: 2 | Status: SHIPPED | OUTPATIENT
Start: 2024-12-16

## 2024-12-30 ENCOUNTER — OFFICE VISIT (OUTPATIENT)
Age: 29
End: 2024-12-30
Payer: MEDICAID

## 2024-12-30 DIAGNOSIS — Z53.21 PATIENT LEFT WITHOUT BEING SEEN: Primary | ICD-10-CM

## 2024-12-30 NOTE — PROGRESS NOTES
Initial Assessment Note   Date: 12/30/2024   Client Name: Makayla F Epley  MRN: [id number]  Start Time: ***  End Time: ***     There are no diagnoses linked to this encounter.     Active Symptoms:   ***     Reported History     Hx of Presenting problem:   Client reported seeking services today due to ***        Trauma Assessment:   Client reported a HX of trauma, which includes ***     Symptoms associated with experienced trauma:     {RW Trauma Symptoms:85220}    Summary:   Client reported the above symptoms ***     Clinician will utilize and trauma focused modality to aid the Client in their treatment.           Family HX of Mental Health Conditions:   Client reported ***     Previous Treatment HX/MH Hospitalizations:   Client reported ***        PHQ-9  The PHQ has not been completed during this encounter.        KATHY-7            Mental Status Exam  Appearance:  {APPEARANCE:0000255074}  Attitude toward clinician:  {ATTITUDE TOWARD CLINICIAN:8890464944}  Speech:    Rate:  {RATE OF SPEECH:8154695463}   Volume:  {SPEECH VOLUME:9067806097}  Affect:  {AFFECT:2432248901}  Thought Processes:  {THOUGHT PROCESS:6548702689}  Thought Content:  {THOUGHT CONTENT:3592518174}  Suicidal Thoughts:  {SUICIDAL IDEATION:6153410885}  Homicidal Thoughts:  {HOMICIDAL IDEATION:9464684531}  Perceptual Disturbance: {PERCEPTUAL DISTURBANCE:0343599254}  Attention and Concentration:  {ATTENTION & CONCENTRATION:9784097068}  Insight and Judgement:  {INSIGHT & JUDGMENT:3848229753}  Memory:  {MEMORY:2234410241}       Support System and Protective Factors     Client reported having the following support system:   ***     Client described their interactions with their support system as ***     Does Client have a responsibility to care for children or pets at this time?   ***     Level of Client's current coping skills:   Client has *** Coping Skills.    Does Client have plans for the future that extend beyond one week?   ***     Can Client  provide a reason for living?   ***     Does Client feel like their life has a purpose?   ***     Does Client feel safe in their living environment?     Client reported they feel ***        Short Term goal for treatment:   “***.”     Long Term goal for treatment:   “***.”     Services Client is Seeking:  { Services Seekin}     [place] Suicide Severity Rating (C-SSRS)  In the past month, have you wished you were dead or wished you could go to sleep and not wake up?       In the past month, have you actually had any thoughts of killing yourself?       Have you been thinking about how you might do this?       Have you had these thoughts and had some intention of acting on them?       Have you started to work out or have you worked out the details of how to kill yourself?       Have you ever in your lifetime done anything, started to do anything, or prepared to do anything to end your life?         Was this within the past three months?       Level of Risk per Screen          C.A.G.E.  C: Have you ever felt like he needed to cut down on your drinking or drug use?  {YES OR NO:80335}   A: Have people annoyed you by criticizing your drinking or drug use?  {YES OR NO:31990}   G: Have you ever felt bad or guilty about your drinking or drug use? {YES OR NO:37088}   E: Have you ever had a drink first thing in the morning to steady her nerves or to get rid of a hangover? {YES OR NO:62298}       Risk of Harm to Self:   { Low - High:15979}    Client reported ***     Does Client currently have or has ever had a HX of HI/Desire to inflict serious injury onto another/Physically Aggressive BX/Verbally aggressive BX?   ***     Risk of Harm to Others:   { Low - High:89091}    Client reported ***        Initial Session Narrative     Clinical Formulation  Client reported seeking services today due to ***    Client *** to having a HX of SI, HI, substance misuse, aggressive physical behaviors, or psychiatric hospitalizations.      Client reported they live ***     Client's current coping skills consist of ***    Per Client's reports, Client meets the criteria for ***    ?   Initial intervention:   Clinician met with Client in person at [facility].     Clinician completed initial assessment, [place] Suicide Related Assessment, safety plan, CAGE addiction assessment, PHQ-9, KATHY 7, trauma assessment, and explored the Client's protective factors and strengthens.     Clinician explained permission to treat, confidentiality, the limitations of confidentiality, and discussed NO SHOW policy.     Clinician utilized the MI technique of active listening and open ended questions, to gather information, provide validation, assess barriers/readiness for change, and build rapport.     Clinician provided the Client with information on DX and possible treatment methods i.e., CBT/DBT/SFT/EMDR.    Clinician provided psychoeducation to the client on the use of the EMDR and the client agreed to the use of EMDR in session.    Client was receptive throughout the session and agreed to work with this Clinician to obtain their treatment goals.     Clinician plans to complete any outstanding assessments needed for treatment and complete the Client's Care/Treatment Plan with the client during the next session.      Employment: {Cancer Employment:05059}    Education: As the client currently enrolled or attending an education or vocation program?{Yes or No:55000}    Arrests in past 30 days? ***    Number of self-help groups attended in past 30 days?  ***    Juan Carlos Lerma LCSW  [facility] [id number]

## 2025-01-27 ENCOUNTER — OFFICE VISIT (OUTPATIENT)
Dept: OBSTETRICS AND GYNECOLOGY | Facility: CLINIC | Age: 30
End: 2025-01-27
Payer: MEDICAID

## 2025-01-27 VITALS — SYSTOLIC BLOOD PRESSURE: 118 MMHG | WEIGHT: 160 LBS | BODY MASS INDEX: 26.63 KG/M2 | DIASTOLIC BLOOD PRESSURE: 74 MMHG

## 2025-01-27 DIAGNOSIS — N64.4 BREAST PAIN, RIGHT: Primary | ICD-10-CM

## 2025-01-27 DIAGNOSIS — Z20.2 POSSIBLE EXPOSURE TO STI: ICD-10-CM

## 2025-01-27 NOTE — PROGRESS NOTES
OBGYN Office Note      Chief Complaint   Patient presents with    Breast Problem        Subjective     HPI  Makayla F Epley is a 29 y.o. female, , who presents with breast complaints of a tenderness.  This is present in the outer right breast(s).    She states she has experienced the right breast pain for  3 week. Pain is located on the outer area of the right breast and radiates up into armpit. Pain described as tender and sore. Rated 6/10. Worse with pressure. Tylenol and IBU does not do anything for the pain. Admits to drinking 1 Latte w/ 3 shots of espresso approx 4 days per week. Also eats chocolate approx 2x/week. The problem has remained unchanged since it began. The patient denies breast lump, changed mole, dryness, nipple discharge, pruritus, rash, skin color change, and skin lesion(s). She has not had a mammogram before. She does have a family history of breast cancer in her maternal grandmother. They were diagnosed at age unknown.. Other concerns include: new sexual partner 1.5-2 weeks ago, would like STD testing. Condoms were used. Denies symptoms. Would also like blood work.     Patient's last menstrual period was 2024 (exact date)..      Current contraception: contraceptive methods: Condoms    Last mammogram:   Last Completed Mammogram       This patient has no relevant Health Maintenance data.          Tobacco Usage?: No     Past Medical History:   Diagnosis Date    Anxiety     Depression     Urogenital trichomoniasis 06/15/2023       Past Surgical History:   Procedure Laterality Date    WISDOM TOOTH EXTRACTION         Family History   Problem Relation Age of Onset    Mental illness Mother     Migraine headaches Mother     Osteoporosis Mother     Stroke Paternal Grandmother     Breast cancer Maternal Grandmother     Thyroid disease Maternal Grandmother     Stroke Maternal Grandmother     Hyperlipidemia Maternal Grandmother     Hypertension Maternal Grandmother     Diabetes Maternal  Grandmother     Cancer Maternal Grandmother     Arthritis Maternal Grandmother     Heart attack Maternal Grandmother     Kidney disease Maternal Grandmother     Stroke Maternal Grandfather     Hyperlipidemia Maternal Grandfather     Arthritis Maternal Grandfather     Ovarian cancer Maternal Aunt     Uterine cancer Neg Hx     Colon cancer Neg Hx           Current Outpatient Medications:     Desvenlafaxine Succinate ER (PRISTIQ) 50 MG 24 hr tablet, Take 1 tablet by mouth Daily., Disp: 90 tablet, Rfl: 1    famciclovir (FAMVIR) 250 MG tablet, Take 1 tablet by mouth 2 (Two) Times a Day. X 5 day sprn fever blister, Disp: 20 tablet, Rfl: 2    Hydrocortisone, Perianal, (Proctosol HC) 2.5 % rectal cream, Insert  into the rectum 2 (Two) Times a Day., Disp: 28 each, Rfl: 1    methocarbamol (ROBAXIN) 750 MG tablet, Take 1 tablet by mouth Daily As Needed for Muscle Spasms., Disp: 90 tablet, Rfl: 1    nystatin (MYCOSTATIN) 490276 UNIT/GM cream, APPLY A THIN LAYER TO THE AFFECTED AREA TWICE DAILY, Disp: , Rfl:     Segesterone-Ethinyl Estradiol (Annovera) 0.15-0.013 MG/24HR ring, Insert 1 Ring into the vagina Take As Directed. for three weeks, remove for 1 week, then repeat, Disp: 1 each, Rfl: 0    SUMAtriptan (Imitrex) 100 MG tablet, Take one tablet at onset of headache. May repeat dose one time in 2 hours if headache not relieved., Disp: 9 tablet, Rfl: 2     Review of Systems   Respiratory: Negative.  Negative for shortness of breath.    Cardiovascular: Negative.  Negative for chest pain.   Gastrointestinal: Negative.  Negative for abdominal distention, abdominal pain and constipation.   Genitourinary:  Positive for breast pain. Negative for breast discharge, breast lump, dyspareunia, dysuria, menstrual problem, pelvic pain, pelvic pressure, urinary incontinence, vaginal bleeding, vaginal discharge and vaginal pain.       I have reviewed and agree with the HPI, ROS, and historical information as entered above. Griselda Ashford,  APRN      Objective   /74   Wt 72.6 kg (160 lb)   LMP 12/30/2024 (Exact Date)   BMI 26.63 kg/m²     Physical Exam  Vitals and nursing note reviewed. Exam conducted with a chaperone present.   Constitutional:       General: She is not in acute distress.     Appearance: Normal appearance. She is not ill-appearing or toxic-appearing.   HENT:      Head: Normocephalic and atraumatic.   Pulmonary:      Effort: Pulmonary effort is normal.   Chest:      Chest wall: No deformity or tenderness.   Breasts:     Breasts are symmetrical.      Right: Tenderness present. No swelling, bleeding, inverted nipple, mass, nipple discharge or skin change.      Left: Normal. No mass, nipple discharge, skin change or tenderness.       Abdominal:      Palpations: Abdomen is soft. There is no mass.      Tenderness: There is no abdominal tenderness.      Hernia: No hernia is present.   Genitourinary:     General: Normal vulva.      Labia:         Right: No rash, tenderness, lesion or injury.         Left: No rash, tenderness, lesion or injury.       Vagina: Normal. No vaginal discharge, erythema, tenderness, bleeding or lesions.      Cervix: Normal. No cervical motion tenderness, discharge, friability, lesion, erythema or cervical bleeding.      Uterus: Normal. Not enlarged and not tender.       Adnexa: Right adnexa normal and left adnexa normal.        Right: No mass or tenderness.          Left: No mass or tenderness.     Lymphadenopathy:      Upper Body:      Right upper body: No supraclavicular, axillary or pectoral adenopathy.      Left upper body: No supraclavicular, axillary or pectoral adenopathy.   Neurological:      Mental Status: She is alert and oriented to person, place, and time.   Psychiatric:         Mood and Affect: Mood normal.         Behavior: Behavior normal.           Assessment & Plan     Assessment     Problem List Items Addressed This Visit    None  Visit Diagnoses       Breast pain, right    -  Primary     Relevant Orders    US Breast Bilateral Complete    Possible exposure to STI        Relevant Orders    RPR, Rfx Qn RPR / Confirm TP    Hepatitis B Surface Antigen    Hepatitis C Antibody    HIV-1 / O / 2 Ag / Antibody 4th Generation    HSV 1 & 2 - Specific Antibody, IgG    Chlamydia trachomatis, Neisseria gonorrhoeae, Trichomonas vaginalis, PCR - Swab, Cervix          Right breast pain  Possible STI exposure. New partner.     Plan    Discussed fibrocystic disease and alleviating measures. Reassured the patient that the finding is most likely benign. Bilateral breast US ordered to r/o other etiologies. She will begin fibrocystic breast change precautions.   Discussed safe sexual practice in detail. Encouraged condom use to prevent STIS. Discussed HSV issues in detail. Patient informed we do not routinely check blood work for HSV. Culture is collected if outbreak occurs. Patient requested blood work for HSV to determine if previously exposed. Ordered. See orders for STD cultures and assays. Will notify pt with results and treat if indicated.   Fibrocystic breast changes and preventing STI education included in patient instructions.   Return in about 26 weeks (around 7/28/2025) for Annual physical or sooner if needed.      Griselda Ashford, APRN  01/27/2025

## 2025-01-28 LAB
C TRACH RRNA SPEC QL NAA+PROBE: NEGATIVE
HBV SURFACE AG SERPL QL IA: NEGATIVE
HCV IGG SERPL QL IA: NON REACTIVE
HIV 1+2 AB+HIV1 P24 AG SERPL QL IA: NON REACTIVE
HSV1 IGG SERPL QL IA: REACTIVE
HSV2 IGG SERPL QL IA: REACTIVE
N GONORRHOEA RRNA SPEC QL NAA+PROBE: NEGATIVE
RPR SER QL: NON REACTIVE
T VAGINALIS RRNA SPEC QL NAA+PROBE: NEGATIVE

## 2025-02-05 ENCOUNTER — HOSPITAL ENCOUNTER (OUTPATIENT)
Dept: ULTRASOUND IMAGING | Facility: HOSPITAL | Age: 30
Discharge: HOME | End: 2025-02-05
Payer: MEDICAID

## 2025-02-05 DIAGNOSIS — N64.4 BREAST PAIN, RIGHT: ICD-10-CM

## 2025-02-05 DIAGNOSIS — B37.31 VAGINAL YEAST INFECTION: Primary | ICD-10-CM

## 2025-02-05 PROCEDURE — 76642 ULTRASOUND BREAST LIMITED: CPT

## 2025-02-05 PROCEDURE — 76642 ULTRASOUND BREAST LIMITED: CPT | Performed by: RADIOLOGY

## 2025-02-05 RX ORDER — FLUCONAZOLE 150 MG/1
TABLET ORAL
Qty: 3 TABLET | Refills: 0 | Status: SHIPPED | OUTPATIENT
Start: 2025-02-05

## 2025-02-16 DIAGNOSIS — F41.9 ANXIETY: ICD-10-CM

## 2025-02-16 DIAGNOSIS — F33.1 MAJOR DEPRESSIVE DISORDER, RECURRENT EPISODE, MODERATE DEGREE: ICD-10-CM

## 2025-02-17 RX ORDER — DESVENLAFAXINE 50 MG/1
50 TABLET, FILM COATED, EXTENDED RELEASE ORAL DAILY
Qty: 30 TABLET | Refills: 0 | Status: SHIPPED | OUTPATIENT
Start: 2025-02-17

## 2025-02-17 NOTE — TELEPHONE ENCOUNTER
Rx Refill Note  Requested Prescriptions     Pending Prescriptions Disp Refills    desvenlafaxine (PRISTIQ) 50 MG 24 hr tablet [Pharmacy Med Name: DESVENLAFAXINE ER SUCCINATE 50MG T] 30 tablet 0     Sig: TAKE 1 TABLET BY MOUTH DAILY      Last office visit with prescribing clinician: 8/9/2024   Last telemedicine visit with prescribing clinician: Visit date not found   Next office visit with prescribing clinician: Visit date not found     PLEASE SCHEDULE AN APPOINTMENT FOR ADDITIONAL REFILLS     Jhon Grady MA  02/17/25, 09:23 EST

## 2025-03-27 ENCOUNTER — TELEPHONE (OUTPATIENT)
Dept: FAMILY MEDICINE CLINIC | Facility: CLINIC | Age: 30
End: 2025-03-27
Payer: MEDICAID

## 2025-03-27 DIAGNOSIS — M25.512 ACUTE PAIN OF LEFT SHOULDER: ICD-10-CM

## 2025-03-27 DIAGNOSIS — F33.1 MAJOR DEPRESSIVE DISORDER, RECURRENT EPISODE, MODERATE DEGREE: Primary | ICD-10-CM

## 2025-03-27 DIAGNOSIS — F33.1 MAJOR DEPRESSIVE DISORDER, RECURRENT EPISODE, MODERATE DEGREE: ICD-10-CM

## 2025-03-27 DIAGNOSIS — F41.9 ANXIETY: ICD-10-CM

## 2025-03-27 RX ORDER — DESVENLAFAXINE 50 MG/1
50 TABLET, FILM COATED, EXTENDED RELEASE ORAL DAILY
Qty: 30 TABLET | Refills: 0 | Status: SHIPPED | OUTPATIENT
Start: 2025-03-27

## 2025-03-27 RX ORDER — METHOCARBAMOL 750 MG/1
750 TABLET, FILM COATED ORAL DAILY PRN
Qty: 90 TABLET | Refills: 1 | Status: SHIPPED | OUTPATIENT
Start: 2025-03-27

## 2025-03-27 NOTE — TELEPHONE ENCOUNTER
Caller: Epley, Makayla Ford    Relationship: Self    Best call back number: [phone]    Requested Prescriptions:   Requested Prescriptions     Pending Prescriptions Disp Refills    desvenlafaxine (PRISTIQ) 50 MG 24 hr tablet [Pharmacy Med Name: DESVENLAFAXINE ER SUCCINATE 50MG T] 30 tablet 0     Sig: TAKE 1 TABLET BY MOUTH DAILY    methocarbamol (ROBAXIN) 750 MG tablet 90 tablet 1     Sig: Take 1 tablet by mouth Daily As Needed for Muscle Spasms.        Pharmacy where request should be sent: "[id number] [address]  AT [facility] - [phone] [facility] [phone] FX     Last office visit with prescribing clinician: 8/9/2024   Last telemedicine visit with prescribing clinician: Visit date not found   Next office visit with prescribing clinician: Visit date not found     Additional details provided by patient: PATIENT HAS 3 DAYS LEFT     Does the patient have less than a 3 day supply:  [x] Yes  [] No    Would you like a call back once the refill request has been completed: [] Yes [x] No    If the office needs to give you a call back, can they leave a voicemail: [] Yes [x] No    Alena Pacheco MA   03/27/25 13:53 EDT

## 2025-03-27 NOTE — TELEPHONE ENCOUNTER
Rx Refill Note  Requested Prescriptions     Pending Prescriptions Disp Refills    desvenlafaxine (PRISTIQ) 50 MG 24 hr tablet [Pharmacy Med Name: DESVENLAFAXINE ER SUCCINATE 50MG T] 30 tablet 0     Sig: TAKE 1 TABLET BY MOUTH DAILY    methocarbamol (ROBAXIN) 750 MG tablet 90 tablet 1     Sig: Take 1 tablet by mouth Daily As Needed for Muscle Spasms.      Last office visit with prescribing clinician: 8/9/2024   Last telemedicine visit with prescribing clinician: Visit date not found   Next office visit with prescribing clinician: 3/27/2025       Jasmyn Leslie MA  03/27/25, 14:32 EDT

## 2025-03-27 NOTE — TELEPHONE ENCOUNTER
Caller: Epley, Makayla Ford    Relationship: Self    Best call back number: [phone]    What is the medical concern/diagnosis: MENTAL HEALTH     What specialty or service is being requested: BEHAVORIAL HEALTH     What is the provider, practice or medical service name: FEMALE PROVIDER     Any additional details: PATIENT IS FOLLOWING UP ON REFERRAL TO A FEMALE PROVIDER

## 2025-04-21 ENCOUNTER — OFFICE VISIT (OUTPATIENT)
Dept: FAMILY MEDICINE CLINIC | Facility: CLINIC | Age: 30
End: 2025-04-21
Payer: MEDICAID

## 2025-04-21 VITALS
BODY MASS INDEX: 28.16 KG/M2 | HEIGHT: 65 IN | WEIGHT: 169 LBS | OXYGEN SATURATION: 100 % | SYSTOLIC BLOOD PRESSURE: 120 MMHG | TEMPERATURE: 97.6 F | HEART RATE: 73 BPM | DIASTOLIC BLOOD PRESSURE: 78 MMHG

## 2025-04-21 DIAGNOSIS — G89.29 CHRONIC RIGHT SHOULDER PAIN: Primary | ICD-10-CM

## 2025-04-21 DIAGNOSIS — M25.511 CHRONIC RIGHT SHOULDER PAIN: Primary | ICD-10-CM

## 2025-04-21 PROCEDURE — 99214 OFFICE O/P EST MOD 30 MIN: CPT

## 2025-04-21 PROCEDURE — 1160F RVW MEDS BY RX/DR IN RCRD: CPT

## 2025-04-21 PROCEDURE — 1125F AMNT PAIN NOTED PAIN PRSNT: CPT

## 2025-04-21 PROCEDURE — 1159F MED LIST DOCD IN RCRD: CPT

## 2025-04-21 RX ORDER — MELOXICAM 7.5 MG/1
7.5 TABLET ORAL DAILY
Qty: 30 TABLET | Refills: 1 | Status: CANCELLED | OUTPATIENT
Start: 2025-04-21

## 2025-04-21 NOTE — PROGRESS NOTES
"    Office Note     Name: Makayla Ford Epley    : 1995     MRN: [id number]     Chief Complaint  Shoulder Pain (Does not hurt today but usually gets to about 9-10 on the scale. Usually at the end of the day. Both shoulders but right side is worse. Going on about a year. Will do 600 mg ibuprofen or 500 mg tylenol. The muscle relaxer doesn't really help her shoulder)    Subjective     History of Present Illness:  Makayla Ford Epley is a 30 y.o. female who presents today for chronic right shoulder pain for the last 1 year. 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 Landed on right neck/shoulder. Had a compression fracture and nerve pain to the left shoulder at that time, but now her pain is mostly on the right and in the front.  Her chiropractor told her she had a previous clavicle fracture according to an old xray he had done. Most of her pain is in the front of her shoulder where her clavicle meets her shoulder. She noticed that it will \"pop\" depending on what she is doing. Notices pain is sharp and is localized to this area. Does not radiate down her arm, from her back or neck. Notices pain worse with driving or lifting arms in front of her body.  Takes muscle relaxer sometimes  for her back but does not benefit her shoulder pain.   Never did PT for it her pain. Lidocaine patches help her symptoms the most.    If she presses on the area she cannot recreate pain, but can make it \"pop.\"   Denies any new or recent injuries. Denies numbness or tingling. States she does not have much back pain anymore. Denies neck pain.     Review of Systems:   Review of Systems   Musculoskeletal:  Positive for arthralgias. Negative for back pain.   Neurological:  Negative for weakness and numbness.       Past Medical History:   Past Medical History:   Diagnosis Date    Anxiety     Depression     Urogenital trichomoniasis 06/15/2023       Past Surgical History:   Past Surgical History: "   Procedure Laterality Date    WISDOM TOOTH EXTRACTION         Family History:   Family History   Problem Relation Age of Onset    Mental illness Mother     Migraine headaches Mother     Osteoporosis Mother     Breast cancer Maternal Grandmother 53    Thyroid disease Maternal Grandmother     Stroke Maternal Grandmother     Hyperlipidemia Maternal Grandmother     Hypertension Maternal Grandmother     Diabetes Maternal Grandmother     Cancer Maternal Grandmother     Arthritis Maternal Grandmother     Heart attack Maternal Grandmother     Kidney disease Maternal Grandmother     Stroke Paternal Grandmother     Ovarian cancer Maternal Aunt     Stroke Maternal Grandfather     Hyperlipidemia Maternal Grandfather     Arthritis Maternal Grandfather     Uterine cancer Neg Hx     Colon cancer Neg Hx     Endometrial cancer Neg Hx        Social History:   Social History     Socioeconomic History    Marital status: Single   Tobacco Use    Smoking status: Never    Smokeless tobacco: Never    Tobacco comments:     socially   Vaping Use    Vaping status: Never Used   Substance and Sexual Activity    Alcohol use: Yes     Comment: Social    Drug use: Yes     Types: Marijuana    Sexual activity: Yes     Partners: Male     Birth control/protection: None       Immunizations:   Immunization History   Administered Date(s) Administered    Covid-19 (Pfizer) Gray Cap Monovalent 09/21/2022, 10/12/2022    DTP / HiB 1995, 1995, 1995    DTaP 10/19/2022    DTaP / HiB / IPV 1997, 1997, 1997    DTaP / IPV 02/05/2001    DTaP 5 02/25/1999    Fluzone (or Fluarix & Flulaval for VFC) >6mos 10/10/2023    Hep B, Adolescent or Pediatric 1997, 1997, 02/09/1998    Hepatitis A 11/21/2012    Hib (PRP-T) 02/25/1999    Hpv9 11/21/2012    IPV 02/05/2001    Influenza, Unspecified 10/03/2022    MMR 02/25/1999, 02/05/2001    Meningococcal MCV4P (Menactra) 11/21/2012    Tdap 01/18/2011, 01/06/2021    Varicella  "02/09/1998, 11/21/2012        Medications:     Current Outpatient Medications:     desvenlafaxine (PRISTIQ) 50 MG 24 hr tablet, TAKE 1 TABLET BY MOUTH DAILY, Disp: 30 tablet, Rfl: 0    famciclovir (FAMVIR) 250 MG tablet, Take 1 tablet by mouth 2 (Two) Times a Day. X 5 day sprn fever blister, Disp: 20 tablet, Rfl: 2    fluconazole (Diflucan) 150 MG tablet, Take one today and repeat every 3 days x 3 doses, Disp: 3 tablet, Rfl: 0    Hydrocortisone, Perianal, (Proctosol HC) 2.5 % rectal cream, Insert  into the rectum 2 (Two) Times a Day., Disp: 28 each, Rfl: 1    methocarbamol (ROBAXIN) 750 MG tablet, Take 1 tablet by mouth Daily As Needed for Muscle Spasms., Disp: 90 tablet, Rfl: 1    nystatin (MYCOSTATIN) 478666 UNIT/GM cream, APPLY A THIN LAYER TO THE AFFECTED AREA TWICE DAILY, Disp: , Rfl:     Segesterone-Ethinyl Estradiol (Annovera) 0.15-0.013 MG/24HR ring, Insert 1 Ring into the vagina Take As Directed. for three weeks, remove for 1 week, then repeat, Disp: 1 each, Rfl: 0    SUMAtriptan (Imitrex) 100 MG tablet, Take one tablet at onset of headache. May repeat dose one time in 2 hours if headache not relieved., Disp: 9 tablet, Rfl: 2    Allergies:   Allergies   Allergen Reactions    Diclofenac Rash     Can tolerate Ibuprofen       Objective     Vital Signs  /78 (BP Location: Right arm, Patient Position: Sitting, Cuff Size: Adult)   Pulse 73   Temp 97.6 °F (36.4 °C) (Temporal)   Ht 165.1 cm (65\")   Wt 76.7 kg (169 lb)   SpO2 100%   BMI 28.12 kg/m²   Estimated body mass index is 28.12 kg/m² as calculated from the following:    Height as of this encounter: 165.1 cm (65\").    Weight as of this encounter: 76.7 kg (169 lb).           Physical Exam  Vitals reviewed.   Constitutional:       General: She is not in acute distress.     Appearance: Normal appearance. She is not ill-appearing or toxic-appearing.   HENT:      Head: Normocephalic and atraumatic.   Eyes:      Extraocular Movements: Extraocular " movements intact.      Pupils: Pupils are equal, round, and reactive to light.   Cardiovascular:      Rate and Rhythm: Normal rate and regular rhythm.      Pulses: Normal pulses.      Heart sounds: Normal heart sounds.   Pulmonary:      Effort: Pulmonary effort is normal.      Breath sounds: Normal breath sounds.   Musculoskeletal:      Right shoulder: No crepitus. Normal range of motion.        Arms:       Cervical back: Normal.      Thoracic back: Normal. No tenderness or bony tenderness.      Comments: Strength equal bilaterally in shoulders.   No pain to palpation over posterior right shoulder.    Skin:     General: Skin is warm.   Neurological:      General: No focal deficit present.      Mental Status: She is alert and oriented to person, place, and time.      Motor: No weakness.   Psychiatric:         Mood and Affect: Mood normal.            Results:  No results found for this or any previous visit (from the past 24 hours).     Assessment and Plan     Assessment/Plan:  Diagnoses and all orders for this visit:    1. Chronic right shoulder pain (Primary)  -     Ambulatory Referral to Orthopedic Surgery  -     XR Shoulder 2+ View Right; Future    Will obtain xray today of her right shoulder specifically as she has never had this done. Suspect prior injury to her AC joint from previous injury 2 years ago.   Discussed use of Meloxicam, but she has history of allergy to Diclofenac.  Continue OTC Tylenol as needed.   Continue use of topical lidocaine patches as this seems to help the most.   Also, recommend use of heat and ice to the area.   Discussed PT, but would like patient to be seen by ortho first.   Avoid aggravating movements for now.   Patient to notify me of any new or worsening symptoms.       Follow Up  Return if symptoms worsen or fail to improve.    [place] PA-C   [facility]

## 2025-04-25 ENCOUNTER — HOSPITAL ENCOUNTER (OUTPATIENT)
Dept: GENERAL RADIOLOGY | Facility: HOSPITAL | Age: 30
Discharge: HOME | End: 2025-04-25
Payer: MEDICAID

## 2025-04-25 DIAGNOSIS — G89.29 CHRONIC RIGHT SHOULDER PAIN: ICD-10-CM

## 2025-04-25 DIAGNOSIS — M25.511 CHRONIC RIGHT SHOULDER PAIN: ICD-10-CM

## 2025-04-25 PROCEDURE — 73030 X-RAY EXAM OF SHOULDER: CPT

## 2025-04-28 ENCOUNTER — TELEPHONE (OUTPATIENT)
Dept: ORTHOPEDIC SURGERY | Facility: CLINIC | Age: 30
End: 2025-04-28

## 2025-04-28 NOTE — TELEPHONE ENCOUNTER
Caller: MAKAYLA EPLEY    Relationship to patient: SELF    Best call back number: [phone]    Chief complaint:  PATIENT WAS A NO SHOW TODAY DUE TO A STOMACH BUG. PATIENT HAS RESCHEDULED APPT. ON 5/2/25.    Type of visit: NEW PATIENT    Requested date:      If rescheduling, when is the original appointment:  4/28/25

## 2025-04-29 DIAGNOSIS — F33.1 MAJOR DEPRESSIVE DISORDER, RECURRENT EPISODE, MODERATE DEGREE: ICD-10-CM

## 2025-04-29 DIAGNOSIS — F41.9 ANXIETY: ICD-10-CM

## 2025-04-29 RX ORDER — DESVENLAFAXINE 50 MG/1
50 TABLET, FILM COATED, EXTENDED RELEASE ORAL DAILY
Qty: 90 TABLET | Refills: 0 | Status: SHIPPED | OUTPATIENT
Start: 2025-04-29

## 2025-05-02 ENCOUNTER — OFFICE VISIT (OUTPATIENT)
Dept: ORTHOPEDIC SURGERY | Facility: CLINIC | Age: 30
End: 2025-05-02
Payer: MEDICAID

## 2025-05-02 VITALS
BODY MASS INDEX: 28.16 KG/M2 | DIASTOLIC BLOOD PRESSURE: 86 MMHG | HEIGHT: 65 IN | SYSTOLIC BLOOD PRESSURE: 140 MMHG | WEIGHT: 169 LBS

## 2025-05-02 DIAGNOSIS — G89.29 CHRONIC RIGHT SHOULDER PAIN: Primary | ICD-10-CM

## 2025-05-02 DIAGNOSIS — M25.511 CHRONIC RIGHT SHOULDER PAIN: Primary | ICD-10-CM

## 2025-05-02 PROCEDURE — 1160F RVW MEDS BY RX/DR IN RCRD: CPT | Performed by: ORTHOPAEDIC SURGERY

## 2025-05-02 PROCEDURE — 99203 OFFICE O/P NEW LOW 30 MIN: CPT | Performed by: ORTHOPAEDIC SURGERY

## 2025-05-02 PROCEDURE — 1159F MED LIST DOCD IN RCRD: CPT | Performed by: ORTHOPAEDIC SURGERY

## 2025-05-02 NOTE — PROGRESS NOTES
[facility]        Subjective     Pain and Initial Evaluation of the Right Shoulder      HPI    Makayla Ford Epley is a 30 y.o. female.  Right shoulder pain for over a year.  She had a fall 3 years ago and fractured her T-spine.  She is a student in nursing.  No recent injury.  She has had therapy in the past.  She has tried anti-inflammatories.  She is right-hand dominant.  She is a nursing student.    Past Medical History:   Diagnosis Date    Anxiety     Arthritis of back 2025    Chiropractor notes osteoarthritis on tspine    Depression     Rotator cuff syndrome March 2024    Shoulder issues over a year now    Scoliosis 2025    Chiro noted lordosis and slight curve in spine to left side    Urogenital trichomoniasis 06/15/2023      Past Surgical History:   Procedure Laterality Date    WISDOM TOOTH EXTRACTION        Family History   Problem Relation Age of Onset    Mental illness Mother     Migraine headaches Mother     Osteoporosis Mother     Diabetes Mother         Type 2    Breast cancer Maternal Grandmother 53    Thyroid disease Maternal Grandmother     Stroke Maternal Grandmother     Hyperlipidemia Maternal Grandmother     Hypertension Maternal Grandmother     Diabetes Maternal Grandmother         Type 2    Cancer Maternal Grandmother         Leukemia, breast cancer    Arthritis Maternal Grandmother     Heart attack Maternal Grandmother     Kidney disease Maternal Grandmother     Broken bones Maternal Grandmother         Broken clavicle    Stroke Paternal Grandmother     Ovarian cancer Maternal Aunt     Stroke Maternal Grandfather     Hyperlipidemia Maternal Grandfather     Arthritis Maternal Grandfather     Uterine cancer Neg Hx     Colon cancer Neg Hx     Endometrial cancer Neg Hx      Social History     Socioeconomic History    Marital status: Single   Tobacco Use    Smoking status: Never    Smokeless tobacco: Never    Tobacco comments:     socially   Vaping Use    Vaping status:  Never Used   Substance and Sexual Activity    Alcohol use: Yes     Alcohol/week: 5.0 - 10.0 standard drinks of alcohol     Types: 5 - 10 Shots of liquor per week     Comment: Some weeks 0.    Drug use: Yes     Types: Marijuana     Comment: Quit 12/16/2024, smoked 3 times since then 04/19-04/21    Sexual activity: Yes     Partners: Male     Birth control/protection: Vaginal contraceptive ring      Current Outpatient Medications on File Prior to Visit   Medication Sig Dispense Refill    desvenlafaxine (PRISTIQ) 50 MG 24 hr tablet TAKE 1 TABLET BY MOUTH DAILY 90 tablet 0    famciclovir (FAMVIR) 250 MG tablet Take 1 tablet by mouth 2 (Two) Times a Day. X 5 day sprn fever blister 20 tablet 2    fluconazole (Diflucan) 150 MG tablet Take one today and repeat every 3 days x 3 doses 3 tablet 0    Hydrocortisone, Perianal, (Proctosol HC) 2.5 % rectal cream Insert  into the rectum 2 (Two) Times a Day. 28 each 1    methocarbamol (ROBAXIN) 750 MG tablet Take 1 tablet by mouth Daily As Needed for Muscle Spasms. 90 tablet 1    Segesterone-Ethinyl Estradiol (Annovera) 0.15-0.013 MG/24HR ring Insert 1 Ring into the vagina Take As Directed. for three weeks, remove for 1 week, then repeat 1 each 0    SUMAtriptan (Imitrex) 100 MG tablet Take one tablet at onset of headache. May repeat dose one time in 2 hours if headache not relieved. 9 tablet 2    [DISCONTINUED] nystatin (MYCOSTATIN) 624580 UNIT/GM cream APPLY A THIN LAYER TO THE AFFECTED AREA TWICE DAILY       No current facility-administered medications on file prior to visit.      Allergies   Allergen Reactions    Diclofenac Rash     Can tolerate Ibuprofen          Review of Systems   Constitutional: Negative.    HENT: Negative.     Eyes: Negative.    Respiratory: Negative.     Cardiovascular: Negative.    Gastrointestinal: Negative.    Endocrine: Negative.    Genitourinary: Negative.    Musculoskeletal:  Positive for arthralgias.   Skin: Negative.    Allergic/Immunologic:  "Negative.    Neurological: Negative.    Hematological: Negative.    Psychiatric/Behavioral: Negative.          I reviewed the patient's chief complaint, history of present illness, review of systems, past medical history, surgical history, family history, social history, medications and allergy list.        Objective      Physical Exam  /86   Ht 165.1 cm (65\")   Wt 76.7 kg (169 lb)   BMI 28.12 kg/m²     Body mass index is 28.12 kg/m².    General  Mental Status - alert  General Appearance - cooperative, well groomed, not in acute distress  Orientation - Oriented X3  Build & Nutrition - well developed and well nourished  Posture - normal posture  Gait - normal gait       Ortho Exam  Right shoulder tender at the AC joint as well as positive Neer C[place]eno impingement.  Full motion full-strength.    Imaging/Studies Reviewed and Interpreted:  Imaging Results (Last 24 Hours)       ** No results found for the last 24 hours. **              Assessment    Assessment:  1. Chronic right shoulder pain        Plan:  Continue over-the-counter medication as needed for discomfort  I have ordered physical therapy.  She will do that close to home.  I have ordered an MRI and we will see her back after the MRI to rule out rotator cuff tear.        Dominic Madrid MD  05/02/25  10:18 EDT      Dictated Utilizing Dragon Dictation.    "

## 2025-05-13 ENCOUNTER — TELEPHONE (OUTPATIENT)
Dept: OBSTETRICS AND GYNECOLOGY | Facility: CLINIC | Age: 30
End: 2025-05-13
Payer: MEDICAID

## 2025-05-13 DIAGNOSIS — Z30.016 ENCOUNTER FOR INITIAL PRESCRIPTION OF TRANSDERMAL PATCH HORMONAL CONTRACEPTIVE DEVICE: Primary | ICD-10-CM

## 2025-05-13 RX ORDER — LEVONORGESTREL/ETHINYL ESTRADIOL 2.6; 2.3 MG/1; MG/1
1 PATCH TRANSDERMAL WEEKLY
Qty: 12 PATCH | Refills: 0 | Status: SHIPPED | OUTPATIENT
Start: 2025-05-13

## 2025-05-13 NOTE — TELEPHONE ENCOUNTER
Left VM informing patient Rx has been sent to pharmacy. Twirla prescribed. Keep annual exam for further refills.

## 2025-05-27 ENCOUNTER — HOSPITAL ENCOUNTER (OUTPATIENT)
Dept: MRI IMAGING | Facility: HOSPITAL | Age: 30
Discharge: HOME | End: 2025-05-27
Admitting: ORTHOPAEDIC SURGERY
Payer: MEDICAID

## 2025-05-27 DIAGNOSIS — G89.29 CHRONIC RIGHT SHOULDER PAIN: ICD-10-CM

## 2025-05-27 DIAGNOSIS — M25.511 CHRONIC RIGHT SHOULDER PAIN: ICD-10-CM

## 2025-05-27 PROCEDURE — 73221 MRI JOINT UPR EXTREM W/O DYE: CPT

## 2025-06-09 ENCOUNTER — TELEPHONE (OUTPATIENT)
Dept: FAMILY MEDICINE CLINIC | Facility: CLINIC | Age: 30
End: 2025-06-09
Payer: MEDICAID

## 2025-06-09 ENCOUNTER — HOSPITAL ENCOUNTER (EMERGENCY)
Facility: HOSPITAL | Age: 30
Discharge: HOME | End: 2025-06-09
Attending: EMERGENCY MEDICINE | Admitting: EMERGENCY MEDICINE
Payer: MEDICAID

## 2025-06-09 ENCOUNTER — APPOINTMENT (OUTPATIENT)
Dept: CT IMAGING | Facility: HOSPITAL | Age: 30
End: 2025-06-09
Payer: MEDICAID

## 2025-06-09 VITALS
BODY MASS INDEX: 27 KG/M2 | TEMPERATURE: 98.3 F | HEIGHT: 66 IN | SYSTOLIC BLOOD PRESSURE: 127 MMHG | DIASTOLIC BLOOD PRESSURE: 83 MMHG | HEART RATE: 70 BPM | WEIGHT: 168 LBS | RESPIRATION RATE: 19 BRPM | OXYGEN SATURATION: 100 %

## 2025-06-09 DIAGNOSIS — R55 SYNCOPE, UNSPECIFIED SYNCOPE TYPE: Primary | ICD-10-CM

## 2025-06-09 DIAGNOSIS — S06.0X0A CONCUSSION WITHOUT LOSS OF CONSCIOUSNESS, INITIAL ENCOUNTER: ICD-10-CM

## 2025-06-09 LAB
ALBUMIN SERPL-MCNC: 4.8 G/DL (ref 3.5–5.2)
ALBUMIN/GLOB SERPL: 1.5 G/DL
ALP SERPL-CCNC: 64 U/L (ref 39–117)
ALT SERPL W P-5'-P-CCNC: 10 U/L (ref 1–33)
ANION GAP SERPL CALCULATED.3IONS-SCNC: 11 MMOL/L (ref 5–15)
AST SERPL-CCNC: 16 U/L (ref 1–32)
B-HCG UR QL: NEGATIVE
BASOPHILS # BLD AUTO: 0.04 10*3/MM3 (ref 0–0.2)
BASOPHILS NFR BLD AUTO: 0.5 % (ref 0–1.5)
BILIRUB SERPL-MCNC: 0.3 MG/DL (ref 0–1.2)
BUN SERPL-MCNC: 5.7 MG/DL (ref 6–20)
BUN/CREAT SERPL: 10 (ref 7–25)
CALCIUM SPEC-SCNC: 9 MG/DL (ref 8.6–10.5)
CHLORIDE SERPL-SCNC: 101 MMOL/L (ref 98–107)
CO2 SERPL-SCNC: 27 MMOL/L (ref 22–29)
CREAT SERPL-MCNC: 0.57 MG/DL (ref 0.57–1)
DEPRECATED RDW RBC AUTO: 41.1 FL (ref 37–54)
EGFRCR SERPLBLD CKD-EPI 2021: 127.1 ML/MIN/1.73
EOSINOPHIL # BLD AUTO: 0.12 10*3/MM3 (ref 0–0.4)
EOSINOPHIL NFR BLD AUTO: 1.6 % (ref 0.3–6.2)
ERYTHROCYTE [DISTWIDTH] IN BLOOD BY AUTOMATED COUNT: 12.6 % (ref 12.3–15.4)
EXPIRATION DATE: NORMAL
GLOBULIN UR ELPH-MCNC: 3.2 GM/DL
GLUCOSE SERPL-MCNC: 90 MG/DL (ref 65–99)
HCT VFR BLD AUTO: 39.6 % (ref 34–46.6)
HGB BLD-MCNC: 12.9 G/DL (ref 12–15.9)
IMM GRANULOCYTES # BLD AUTO: 0.02 10*3/MM3 (ref 0–0.05)
IMM GRANULOCYTES NFR BLD AUTO: 0.3 % (ref 0–0.5)
INTERNAL NEGATIVE CONTROL: NORMAL
INTERNAL POSITIVE CONTROL: NORMAL
LYMPHOCYTES # BLD AUTO: 1.68 10*3/MM3 (ref 0.7–3.1)
LYMPHOCYTES NFR BLD AUTO: 22 % (ref 19.6–45.3)
Lab: NORMAL
MCH RBC QN AUTO: 29.3 PG (ref 26.6–33)
MCHC RBC AUTO-ENTMCNC: 32.6 G/DL (ref 31.5–35.7)
MCV RBC AUTO: 89.8 FL (ref 79–97)
MONOCYTES # BLD AUTO: 0.37 10*3/MM3 (ref 0.1–0.9)
MONOCYTES NFR BLD AUTO: 4.8 % (ref 5–12)
NEUTROPHILS NFR BLD AUTO: 5.41 10*3/MM3 (ref 1.7–7)
NEUTROPHILS NFR BLD AUTO: 70.8 % (ref 42.7–76)
NRBC BLD AUTO-RTO: 0 /100 WBC (ref 0–0.2)
NT-PROBNP SERPL-MCNC: 117.3 PG/ML (ref 0–450)
PLATELET # BLD AUTO: 272 10*3/MM3 (ref 140–450)
PMV BLD AUTO: 10.3 FL (ref 6–12)
POTASSIUM SERPL-SCNC: 3.8 MMOL/L (ref 3.5–5.2)
PROT SERPL-MCNC: 8 G/DL (ref 6–8.5)
QT INTERVAL: 392 MS
QTC INTERVAL: 410 MS
RBC # BLD AUTO: 4.41 10*6/MM3 (ref 3.77–5.28)
SODIUM SERPL-SCNC: 139 MMOL/L (ref 136–145)
TROPONIN T SERPL HS-MCNC: <6 NG/L
WBC NRBC COR # BLD AUTO: 7.64 10*3/MM3 (ref 3.4–10.8)

## 2025-06-09 PROCEDURE — 80053 COMPREHEN METABOLIC PANEL: CPT | Performed by: EMERGENCY MEDICINE

## 2025-06-09 PROCEDURE — 36415 COLL VENOUS BLD VENIPUNCTURE: CPT

## 2025-06-09 PROCEDURE — 70450 CT HEAD/BRAIN W/O DYE: CPT

## 2025-06-09 PROCEDURE — 99284 EMERGENCY DEPT VISIT MOD MDM: CPT

## 2025-06-09 PROCEDURE — 81025 URINE PREGNANCY TEST: CPT | Performed by: EMERGENCY MEDICINE

## 2025-06-09 PROCEDURE — 84484 ASSAY OF TROPONIN QUANT: CPT | Performed by: EMERGENCY MEDICINE

## 2025-06-09 PROCEDURE — 83880 ASSAY OF NATRIURETIC PEPTIDE: CPT | Performed by: EMERGENCY MEDICINE

## 2025-06-09 PROCEDURE — 93005 ELECTROCARDIOGRAM TRACING: CPT | Performed by: EMERGENCY MEDICINE

## 2025-06-09 PROCEDURE — 85025 COMPLETE CBC W/AUTO DIFF WBC: CPT | Performed by: EMERGENCY MEDICINE

## 2025-06-09 NOTE — ED PROVIDER NOTES
[facility]    EMERGENCY DEPARTMENT ENCOUNTER      Pt Name: Makayla Ford Epley  MRN: [id number]  YOB: 1995  Date of evaluation: 6/9/2025  Provider: Yves Singh MD    CHIEF COMPLAINT       Chief Complaint   Patient presents with    Syncope         HISTORY OF PRESENT ILLNESS   Makayla Ford Epley is a 30 y.o. female who presents to the emergency department after syncopal episode that occurred on Friday.  Patient reports that she had some abdominal cramping and felt the need to have a bowel movement.  She was sitting on the commode when she became very lightheaded and subsequently laid down on the tile floor and passed out for several seconds.  When she woke up, she had some bruising on the right side of her face and was concerned that she may have had a seizure.  She has no prior history of seizures and has had no additional episodes since that time.  She has a past history of syncopal episodes and has been told that they are likely vagal episodes.  She has some mild right-sided headache but no neck pain or other associated symptoms.  Denies any neurologic symptoms including any visual changes, peripheral paresthesias, weakness, numbness, or difficulty ambulating.      Nursing notes were reviewed.    REVIEW OF SYSTEMS     ROS:  A chief complaint appropriate review of systems was completed and is negative except as noted in the HPI.      PAST MEDICAL HISTORY     Past Medical History:   Diagnosis Date    Anxiety     Arthritis of back 2025    Chiropractor notes osteoarthritis on tspine    Depression     Rotator cuff syndrome March 2024    Shoulder issues over a year now    Scoliosis 2025    Chiro noted lordosis and slight curve in spine to left side    Urogenital trichomoniasis 06/15/2023         SURGICAL HISTORY       Past Surgical History:   Procedure Laterality Date    WISDOM TOOTH EXTRACTION           CURRENT MEDICATIONS     No current facility-administered medications for this encounter.    Current  Outpatient Medications:     desvenlafaxine (PRISTIQ) 50 MG 24 hr tablet, TAKE 1 TABLET BY MOUTH DAILY, Disp: 90 tablet, Rfl: 0    famciclovir (FAMVIR) 250 MG tablet, Take 1 tablet by mouth 2 (Two) Times a Day. X 5 day sprn fever blister, Disp: 20 tablet, Rfl: 2    fluconazole (Diflucan) 150 MG tablet, Take one today and repeat every 3 days x 3 doses, Disp: 3 tablet, Rfl: 0    Hydrocortisone, Perianal, (Proctosol HC) 2.5 % rectal cream, Insert  into the rectum 2 (Two) Times a Day., Disp: 28 each, Rfl: 1    Levonorgestrel-Eth Estradiol (Twirla) 120-30 MCG/24HR patch weekly, Place 1 patch on the skin as directed by provider 1 (One) Time Per Week., Disp: 12 patch, Rfl: 0    methocarbamol (ROBAXIN) 750 MG tablet, Take 1 tablet by mouth Daily As Needed for Muscle Spasms., Disp: 90 tablet, Rfl: 1    SUMAtriptan (Imitrex) 100 MG tablet, Take one tablet at onset of headache. May repeat dose one time in 2 hours if headache not relieved., Disp: 9 tablet, Rfl: 2    ALLERGIES     Diclofenac    FAMILY HISTORY       Family History   Problem Relation Age of Onset    Mental illness Mother     Migraine headaches Mother     Osteoporosis Mother     Diabetes Mother         Type 2    Breast cancer Maternal Grandmother 53    Thyroid disease Maternal Grandmother     Stroke Maternal Grandmother     Hyperlipidemia Maternal Grandmother     Hypertension Maternal Grandmother     Diabetes Maternal Grandmother         Type 2    Cancer Maternal Grandmother         Leukemia, breast cancer    Arthritis Maternal Grandmother     Heart attack Maternal Grandmother     Kidney disease Maternal Grandmother     Broken bones Maternal Grandmother         Broken clavicle    Stroke Paternal Grandmother     Ovarian cancer Maternal Aunt     Stroke Maternal Grandfather     Hyperlipidemia Maternal Grandfather     Arthritis Maternal Grandfather     Uterine cancer Neg Hx     Colon cancer Neg Hx     Endometrial cancer Neg Hx           SOCIAL HISTORY       Social  "History     Socioeconomic History    Marital status: Single   Tobacco Use    Smoking status: Never    Smokeless tobacco: Never    Tobacco comments:     socially   Vaping Use    Vaping status: Never Used   Substance and Sexual Activity    Alcohol use: Yes     Alcohol/week: 5.0 - 10.0 standard drinks of alcohol     Types: 5 - 10 Shots of liquor per week     Comment: Some weeks 0.    Drug use: Yes     Types: Marijuana     Comment: Quit 12/16/2024, smoked 3 times since then 04/19-04/21    Sexual activity: Yes     Partners: Male     Birth control/protection: Vaginal contraceptive ring         PHYSICAL EXAM    (up to 7 for level 4, 8 or more for level 5)     Vitals:    06/09/25 1247   BP: 127/83   BP Location: Left arm   Patient Position: Sitting   Pulse: 70   Resp: 19   Temp: 98.3 °F (36.8 °C)   TempSrc: Oral   SpO2: 100%   Weight: 76.2 kg (168 lb)   Height: 167.6 cm (66\")       General: Awake, alert, no acute distress.  HEENT: Conjunctivae normal.  Neck: Trachea midline.  Cardiac: Heart regular rate, rhythm, no murmurs, rubs, or gallops  Lungs: Lungs are clear to auscultation, there is no wheezing, rhonchi, or rales. There is no use of accessory muscles.  Abdomen: Abdomen is soft, nontender, nondistended. There are no firm or pulsatile masses, no rebound rigidity or guarding.   Musculoskeletal: No deformity.  Neuro: Alert and oriented x 4.  Patient observed ambulating without difficulty.  Moves all extremities equally.  Dermatology: Skin is warm and dry  Psych: Mentation is grossly normal, cognition is grossly normal. Affect is appropriate.        DIAGNOSTIC RESULTS     EKG: All EKGs are interpreted by the Emergency Department Physician who either signs or Co-signs this chart in the absence of a cardiologist.    ECG 12 Lead Chest Pain   Preliminary Result   Test Reason : Chest Pain   Blood Pressure :   */*   mmHG   Vent. Rate :  66 BPM     Atrial Rate :  66 BPM      P-R Int : 120 ms          QRS Dur :  86 ms       QT " Int : 392 ms       P-R-T Axes :  61  79  30 degrees     QTcB Int : 410 ms      ** Poor data quality, interpretation may be adversely affected   Normal sinus rhythm   Normal ECG   No previous ECGs available      Referred By:            Confirmed By:             RADIOLOGY:   [x] Radiologist's Report Reviewed:  CT Head Without Contrast   Final Result   Impression:   No acute intracranial process or calvarial fracture identified.            Electronically Signed: Serjio Casillas MD     6/9/2025 2:27 PM EDT     Workstation ID: [id number]          I ordered and independently reviewed the above noted radiographic studies.        LABS:    I have reviewed and interpreted all of the currently available lab results from this visit (if applicable):  Results for orders placed or performed during the hospital encounter of 06/09/25   Comprehensive Metabolic Panel    Collection Time: 06/09/25  2:04 PM    Specimen: Blood   Result Value Ref Range    Glucose 90 65 - 99 mg/dL    BUN 5.7 (L) 6.0 - 20.0 mg/dL    Creatinine 0.57 0.57 - 1.00 mg/dL    Sodium 139 136 - 145 mmol/L    Potassium 3.8 3.5 - 5.2 mmol/L    Chloride 101 98 - 107 mmol/L    CO2 27.0 22.0 - 29.0 mmol/L    Calcium 9.0 8.6 - 10.5 mg/dL    Total Protein 8.0 6.0 - 8.5 g/dL    Albumin 4.8 3.5 - 5.2 g/dL    ALT (SGPT) 10 1 - 33 U/L    AST (SGOT) 16 1 - 32 U/L    Alkaline Phosphatase 64 39 - 117 U/L    Total Bilirubin 0.3 0.0 - 1.2 mg/dL    Globulin 3.2 gm/dL    A/G Ratio 1.5 g/dL    BUN/Creatinine Ratio 10.0 7.0 - 25.0    Anion Gap 11.0 5.0 - 15.0 mmol/L    eGFR 127.1 >60.0 mL/min/1.73   High Sensitivity Troponin T    Collection Time: 06/09/25  2:04 PM    Specimen: Blood   Result Value Ref Range    HS Troponin T <6 <14 ng/L   BNP    Collection Time: 06/09/25  2:04 PM    Specimen: Blood   Result Value Ref Range    proBNP 117.3 0.0 - 450.0 pg/mL   CBC Auto Differential    Collection Time: 06/09/25  2:04 PM    Specimen: Blood   Result Value Ref Range    WBC 7.64 3.40 - 10.80  10*3/mm3    RBC 4.41 3.77 - 5.28 10*6/mm3    Hemoglobin 12.9 12.0 - 15.9 g/dL    Hematocrit 39.6 34.0 - 46.6 %    MCV 89.8 79.0 - 97.0 fL    MCH 29.3 26.6 - 33.0 pg    MCHC 32.6 31.5 - 35.7 g/dL    RDW 12.6 12.3 - 15.4 %    RDW-SD 41.1 37.0 - 54.0 fl    MPV 10.3 6.0 - 12.0 fL    Platelets 272 140 - 450 10*3/mm3    Neutrophil % 70.8 42.7 - 76.0 %    Lymphocyte % 22.0 19.6 - 45.3 %    Monocyte % 4.8 (L) 5.0 - 12.0 %    Eosinophil % 1.6 0.3 - 6.2 %    Basophil % 0.5 0.0 - 1.5 %    Immature Grans % 0.3 0.0 - 0.5 %    Neutrophils, Absolute 5.41 1.70 - 7.00 10*3/mm3    Lymphocytes, Absolute 1.68 0.70 - 3.10 10*3/mm3    Monocytes, Absolute 0.37 0.10 - 0.90 10*3/mm3    Eosinophils, Absolute 0.12 0.00 - 0.40 10*3/mm3    Basophils, Absolute 0.04 0.00 - 0.20 10*3/mm3    Immature Grans, Absolute 0.02 0.00 - 0.05 10*3/mm3    nRBC 0.0 0.0 - 0.2 /100 WBC   POC Urine Pregnancy    Collection Time: 06/09/25  2:04 PM    Specimen: Urine   Result Value Ref Range    HCG, Urine, QL Negative     Lot Number [id number]     Internal Positive Control Passed     Internal Negative Control Passed     Expiration Date 2026-10-17    ECG 12 Lead Chest Pain    Collection Time: 06/09/25  2:13 PM   Result Value Ref Range    QT Interval 392 ms    QTC Interval 410 ms        If labs were ordered, I independently reviewed the results and considered them in treating the patient.      EMERGENCY DEPARTMENT COURSE and DIFFERENTIAL DIAGNOSIS/MDM:   Vitals:  AS OF 15:24 EDT    BP - 127/83  HR - 70  TEMP - 98.3 °F (36.8 °C) (Oral)  O2 SATS - 100%        Discussion below represents my analysis of pertinent findings related to patient's condition, differential diagnosis, treatment plan and final disposition.      Differential diagnosis:  The differential diagnosis associated with the patient's presentation includes: Vasovagal syncope, orthostatic syncope, ACS, dysrhythmia, seizure, concussion      Independent interpretations (ECG/rhythm strip/X-ray/US/CT scan): I  independently interpreted the patient's head CT and cardiac monitor.  No evidence of ICH patient is in sinus rhythm.      Patient's care impacted by:   [] Diabetes   [] Hypertension   [] Coronary Artery Disease   [] Cancer   [x] Other: History of syncope    Care significantly affected by Social Determinants of Health (housing and economic circumstances, unemployment)    [] Yes     [x] No   If yes, Patient's care significantly limited by  Social Determinants of Health including:    [] Inadequate housing    [] Low income    [] Alcoholism and drug addiction in family    [] Problems related to primary support group    [] Unemployment    [] Problems related to employment    [] Other Social Determinants of Health:       Consideration of admission/observation vs discharge: Considered observation, patient is low risk, unremarkable workup, stable for outpatient follow-up.  Have referred to syncope clinic.      ED Course:    ED Course as of 06/09/25 1524   Mon Jun 09, 2025   1519 On reexamination, the patient remains well-appearing and nontoxic.  She is resting comfortably bed and is completely asymptomatic.  Head CT is negative.  She is concerned that she may have had a seizure during her syncopal episode a couple of days ago, however nothing in her history is suggestive of this.  She has a history of vasovagal episodes and this episode that she is describing is classic for that.  No associated chest pain, shortness of breath, significant family history of cardiovascular disease to suggest an underlying emergent process.  ECG is within normal limits, head CT is unremarkable, labs reassuring.  She is stable for discharge home with outpatient follow-up at this point.  She has had recurrent episodes of syncope and so we will refer her to the syncope clinic for follow-up. [NS]      ED Course User Index  [NS] Yves Singh MD           I had a discussion with the patient/family regarding diagnosis, diagnostic results,  treatment plan, and medications.  The patient/family indicated understanding of these instructions.  I spent adequate time at the bedside preceding discharge necessary to personally discuss the aftercare instructions, giving patient education, providing explanations of the results of our evaluations/findings, and my decision making to assure that the patient/family understand the plan of care.  Time was allotted to answer questions at that time and throughout the ED course.  Emphasis was placed on timely follow-up after discharge.  I also discussed the potential for the development of an acute emergent condition requiring further evaluation, admission, or even surgical intervention. I discussed that we found nothing during the visit today indicating the need for further workup, admission, or the presence of an unstable medical condition.  I encouraged the patient to return to the emergency department immediately for ANY concerns, worsening, new complaints, or if symptoms persist and unable to seek follow-up in a timely fashion.  The patient/family expressed understanding and agreement with this plan.  The patient will follow-up with their PCP in 1-2 days for reevaluation.           FINAL IMPRESSION      1. Syncope, unspecified syncope type    2. Concussion without loss of consciousness, initial encounter          DISPOSITION/PLAN     ED Disposition       ED Disposition   Discharge    Condition   Stable    Comment   --                 Comment: Please note this report has been produced using speech recognition software.      Yves Singh MD  Attending Emergency Physician             Yves Singh MD  06/09/25 0580

## 2025-06-09 NOTE — TELEPHONE ENCOUNTER
PT STATES THIS PAST WEEKEND ON FRIDAY HAD A FEELING SHE WAS GOING TO PASS OUT SO SHE LAID DOWN ON THE GROUND AND ENDED UP PASSING OUT. SHE SAID WHEN SHE CAME TO SHE HAD ABRASIONS ON HER AND A KNOT ON HER HEAD AND THE INSIDE OF HER LIP WAS BUSTED , SHE BELIEVES SHE HAD A SEIZURE. RECOMMENDED THAT SHE GOES TO THE ER AND SHE STATED SHE IS GOING UP THERE TODAY.

## 2025-06-28 LAB
QT INTERVAL: 392 MS
QTC INTERVAL: 410 MS

## 2025-07-28 ENCOUNTER — OFFICE VISIT (OUTPATIENT)
Dept: OBSTETRICS AND GYNECOLOGY | Facility: CLINIC | Age: 30
End: 2025-07-28
Payer: MEDICAID

## 2025-07-28 VITALS
WEIGHT: 179.6 LBS | SYSTOLIC BLOOD PRESSURE: 126 MMHG | HEIGHT: 66 IN | BODY MASS INDEX: 28.87 KG/M2 | DIASTOLIC BLOOD PRESSURE: 80 MMHG

## 2025-07-28 DIAGNOSIS — F33.1 MAJOR DEPRESSIVE DISORDER, RECURRENT EPISODE, MODERATE DEGREE: ICD-10-CM

## 2025-07-28 DIAGNOSIS — L68.0 FEMALE HIRSUTISM: ICD-10-CM

## 2025-07-28 DIAGNOSIS — Z11.3 SCREENING EXAMINATION FOR STD (SEXUALLY TRANSMITTED DISEASE): ICD-10-CM

## 2025-07-28 DIAGNOSIS — F41.9 ANXIETY: ICD-10-CM

## 2025-07-28 DIAGNOSIS — Z01.419 PAP TEST, AS PART OF ROUTINE GYNECOLOGICAL EXAMINATION: Primary | ICD-10-CM

## 2025-07-28 DIAGNOSIS — L70.0 CYSTIC ACNE VULGARIS: ICD-10-CM

## 2025-07-28 RX ORDER — SPIRONOLACTONE 50 MG/1
50 TABLET, FILM COATED ORAL DAILY
Qty: 30 TABLET | Refills: 11 | Status: SHIPPED | OUTPATIENT
Start: 2025-07-28

## 2025-07-28 RX ORDER — DESVENLAFAXINE 50 MG/1
50 TABLET, FILM COATED, EXTENDED RELEASE ORAL DAILY
Qty: 90 TABLET | Refills: 0 | Status: SHIPPED | OUTPATIENT
Start: 2025-07-28

## 2025-07-28 NOTE — PROGRESS NOTES
Gynecologic Annual Exam Note        Gynecologic Exam (Annual )        Subjective     HPI  Makayla Ford Epley is a 30 y.o.  female who presents for annual well woman exam as a established patient. There were no changes to her medical or surgical history since her last visit. Patient's last menstrual period was 2025. Her periods occur every 25-30 days, lasting 5 days.  The flow is heavy for the first 2 days then becomes light. She reports dysmenorrhea is severe occurring first 1-2 days of flow. She reports that she had two periods last month, but believe it is due to recently stopping birth control patch.      Marital Status: single.  She is sexually active. She has not had new partners. STD testing recommendations have been explained to the patient and she does desire STD testing.    The patient would like to discuss the following complaints today: weight gain, cystic acne, and abnormal facial hair that she believes is hormonal. She reports having to shave her face multiple times a week.    Additional OB/GYN History   contraceptive methods: Condoms  Desires to: do not start contraception  Thromboembolic Disease: none  History of migraines: no  Age of menarche: 15    History of STD: yes HSV and GC/CHLAMYDIA    Last Pap : 2024. Results: ASCUS. HPV: HPV pool +.   Last Completed Pap Smear            Awaiting Completion       PAP SMEAR (Every 3 Years) Order placed this encounter      2025  Order placed for LIQUID-BASED PAP SMEAR WITH HPV GENOTYPING REGARDLESS OF INTERPRETATION ([facility]END[facility]OS[facility] MIGUEL) by Myrna Nova APRN    2024  LIQUID-BASED PAP SMEAR WITH HPV GENOTYPING IF ASCUS ([facility],JOSÉ MIGUEL)    2023  LIQUID-BASED PAP SMEAR, [facility] ([facility]II,[facility],JOS[facility] MIGUEL)                             History of abnormal Pap smear: no  Gardasil status:completed  Family history of uterine, colon, breast, or ovarian cancer: yes - MGM had breast cancer  Performs monthly Self-Breast Exam: yes  Exercises  Regularly:occasionally  Feelings of Anxiety or Depression: yes - treated with medication   Tobacco Usage?: No       Current Outpatient Medications:     desvenlafaxine (PRISTIQ) 50 MG 24 hr tablet, TAKE 1 TABLET BY MOUTH DAILY, Disp: 90 tablet, Rfl: 0    famciclovir (FAMVIR) 250 MG tablet, Take 1 tablet by mouth 2 (Two) Times a Day. X 5 day sprn fever blister, Disp: 20 tablet, Rfl: 2    Hydrocortisone, Perianal, (Proctosol HC) 2.5 % rectal cream, Insert  into the rectum 2 (Two) Times a Day., Disp: 28 each, Rfl: 1    methocarbamol (ROBAXIN) 750 MG tablet, Take 1 tablet by mouth Daily As Needed for Muscle Spasms., Disp: 90 tablet, Rfl: 1    SUMAtriptan (Imitrex) 100 MG tablet, Take one tablet at onset of headache. May repeat dose one time in 2 hours if headache not relieved., Disp: 9 tablet, Rfl: 2    spironolactone (Aldactone) 50 MG tablet, Take 1 tablet by mouth Daily., Disp: 30 tablet, Rfl: 11     Patient denies the need for medication refills today.    OB History          0    Para   0    Term   0       0    AB   0    Living   0         SAB   0    IAB   0    Ectopic   0    Molar   0    Multiple   0    Live Births   0                Health Maintenance   Topic Date Due    MOST FORM  Never done    ANNUAL PHYSICAL  12/15/2023    COVID-19 Vaccine (3 - - season) 2024    Annual Gynecologic Pelvic and Breast Exam  2025    INFLUENZA VACCINE  10/01/2025    PAP SMEAR  2027    TDAP/TD VACCINES (3 - Td or Tdap) 2031    HEPATITIS C SCREENING  Completed    Pneumococcal Vaccine 0-49  Aged Out    CHLAMYDIA SCREENING  Discontinued       Past Medical History:   Diagnosis Date    Abnormal Pap smear of cervix     Anxiety     Arthritis of back     Chiropractor notes osteoarthritis on tspine    Depression     Rotator cuff syndrome 2024    Shoulder issues over a year now    Scoliosis     Chiro noted lordosis and slight curve in spine to left side    Urogenital trichomoniasis  "06/15/2023        Past Surgical History:   Procedure Laterality Date    WISDOM TOOTH EXTRACTION         The additional following portions of the patient's history were reviewed and updated as appropriate: allergies, current medications, past family history, past medical history, past social history, past surgical history, and problem list.    Review of Systems   Constitutional: Negative.    HENT: Negative.     Eyes: Negative.    Respiratory: Negative.     Cardiovascular: Negative.    Gastrointestinal: Negative.    Endocrine: Negative.    Genitourinary: Negative.    Musculoskeletal: Negative.    Skin: Negative.         Facial hair   Allergic/Immunologic: Negative.    Neurological: Negative.    Hematological: Negative.    Psychiatric/Behavioral: Negative.           I have reviewed and agree with the HPI, ROS, and historical information as entered above. Hananeavelino JULIO C Nova, APRN          Objective   /80   Ht 167.6 cm (66\")   Wt 81.5 kg (179 lb 9.6 oz)   LMP 07/18/2025   BMI 28.99 kg/m²     Physical Exam  Vitals and nursing note reviewed. Exam conducted with a chaperone present.   Constitutional:       Appearance: Normal appearance. She is well-developed.   HENT:      Head: Normocephalic and atraumatic.   Neck:      Thyroid: No thyroid mass or thyromegaly.   Cardiovascular:      Rate and Rhythm: Normal rate.      Heart sounds: No murmur heard.  Pulmonary:      Effort: Pulmonary effort is normal. No retractions.      Breath sounds: No wheezing, rhonchi or rales.   Chest:      Chest wall: No mass or tenderness.   Breasts:     Right: Normal. No mass, nipple discharge, skin change or tenderness.      Left: Normal. No mass, nipple discharge, skin change or tenderness.   Abdominal:      Palpations: Abdomen is soft. Abdomen is not rigid. There is no mass.      Tenderness: There is no abdominal tenderness. There is no guarding.      Hernia: No hernia is present.   Genitourinary:     General: Normal vulva.      " Exam position: Lithotomy position.      Labia:         Right: No rash, tenderness or lesion.         Left: No rash, tenderness or lesion.       Vagina: Normal. No vaginal discharge or lesions.      Cervix: No cervical motion tenderness, discharge, lesion or cervical bleeding.      Uterus: Normal. Not enlarged, not fixed and not tender.       Adnexa: Right adnexa normal and left adnexa normal.        Right: No mass or tenderness.          Left: No mass or tenderness.        Rectum: Normal. No external hemorrhoid.   Musculoskeletal:      Cervical back: Normal range of motion. No muscular tenderness.   Neurological:      Mental Status: She is alert and oriented to person, place, and time.   Psychiatric:         Behavior: Behavior normal.            Assessment and Plan    Problem List Items Addressed This Visit    None  Visit Diagnoses         Pap test, as part of routine gynecological examination    -  Primary    Relevant Orders    LIQUID-BASED PAP SMEAR WITH HPV GENOTYPING REGARDLESS OF INTERPRETATION ([facility])      Screening examination for STD (sexually transmitted disease)        Relevant Orders    LIQUID-BASED PAP SMEAR WITH HPV GENOTYPING REGARDLESS OF INTERPRETATION ([facility])      Female hirsutism        Relevant Medications    spironolactone (Aldactone) 50 MG tablet    Other Relevant Orders    CBC & Differential    Comprehensive Metabolic Panel    Vitamin D 25 Hydroxy    Lipid Panel    TSH Rfx On Abnormal To Free T4    DHEA-Sulfate    Hemoglobin A1c    Testosterone    Prolactin    TSH    Estradiol    FSH & LH    US Non-ob Transvaginal      Cystic acne vulgaris        Relevant Medications    spironolactone (Aldactone) 50 MG tablet    Other Relevant Orders    CBC & Differential    Comprehensive Metabolic Panel    Vitamin D 25 Hydroxy    Lipid Panel    TSH Rfx On Abnormal To Free T4    DHEA-Sulfate    Hemoglobin A1c    Testosterone    Prolactin    TSH    Estradiol    FSH & LH    US Non-ob Transvaginal             GYN annual well woman exam.   Reviewed pap guidelines. Pap today with STD testing.  Pt has been off of birth control for a few months and does not desire to restart at this time. Pt reports weight gain, cystic acne, and hirsutism that has worsened since stopping birth control. We discussed the possibility of PCOS and she would like to have labs and US done. Will return for US.  Acne and hirsutism: Aldactone 50 mg daily Rx'd.   Encouraged use of condoms for STD prevention.  Fibrocystic breast changes - Encouraged decreasing caffeine, supportive bra, low dose vitamin E supplementation.  Reviewed monthly self breast exams.  Instructed to call with lumps, pain, or breast discharge.    Reviewed BMI and weight loss as preventative health measures.   Reccommended Flu Vaccine in Fall of each year.  RTC in 1 year or PRN with problems  Return in about 1 week (around 8/4/2025) for Ultrasound.    Myrna Nova, APRN  07/28/2025

## 2025-07-29 LAB
25(OH)D3+25(OH)D2 SERPL-MCNC: 31.1 NG/ML (ref 30–100)
ALBUMIN SERPL-MCNC: 4.6 G/DL (ref 3.5–5.2)
ALBUMIN/GLOB SERPL: 1.6 G/DL
ALP SERPL-CCNC: 93 U/L (ref 39–117)
ALT SERPL-CCNC: 12 U/L (ref 1–33)
AST SERPL-CCNC: 16 U/L (ref 1–32)
BASOPHILS # BLD AUTO: 0.05 10*3/MM3 (ref 0–0.2)
BASOPHILS NFR BLD AUTO: 0.6 % (ref 0–1.5)
BILIRUB SERPL-MCNC: 0.4 MG/DL (ref 0–1.2)
BUN SERPL-MCNC: 8 MG/DL (ref 6–20)
BUN/CREAT SERPL: 11.1 (ref 7–25)
CALCIUM SERPL-MCNC: 9.5 MG/DL (ref 8.6–10.5)
CHLORIDE SERPL-SCNC: 98 MMOL/L (ref 98–107)
CHOLEST SERPL-MCNC: 187 MG/DL (ref 0–200)
CO2 SERPL-SCNC: 27.2 MMOL/L (ref 22–29)
CREAT SERPL-MCNC: 0.72 MG/DL (ref 0.57–1)
DHEA-S SERPL-MCNC: 383 UG/DL (ref 84.8–378)
EGFRCR SERPLBLD CKD-EPI 2021: 117 ML/MIN/1.73
EOSINOPHIL # BLD AUTO: 0.15 10*3/MM3 (ref 0–0.4)
EOSINOPHIL NFR BLD AUTO: 1.9 % (ref 0.3–6.2)
ERYTHROCYTE [DISTWIDTH] IN BLOOD BY AUTOMATED COUNT: 12.8 % (ref 12.3–15.4)
ESTRADIOL SERPL-MCNC: 29.3 PG/ML
FSH SERPL-ACNC: 6.2 MIU/ML
GLOBULIN SER CALC-MCNC: 2.9 GM/DL
GLUCOSE SERPL-MCNC: 88 MG/DL (ref 65–99)
HBA1C MFR BLD: 5.1 % (ref 4.8–5.6)
HCT VFR BLD AUTO: 39.4 % (ref 34–46.6)
HDLC SERPL-MCNC: 57 MG/DL (ref 40–60)
HGB BLD-MCNC: 13 G/DL (ref 12–15.9)
IMM GRANULOCYTES # BLD AUTO: 0.05 10*3/MM3 (ref 0–0.05)
IMM GRANULOCYTES NFR BLD AUTO: 0.6 % (ref 0–0.5)
LDLC SERPL CALC-MCNC: 112 MG/DL (ref 0–100)
LH SERPL-ACNC: 13.9 MIU/ML
LYMPHOCYTES # BLD AUTO: 1.79 10*3/MM3 (ref 0.7–3.1)
LYMPHOCYTES NFR BLD AUTO: 22.6 % (ref 19.6–45.3)
MCH RBC QN AUTO: 29.3 PG (ref 26.6–33)
MCHC RBC AUTO-ENTMCNC: 33 G/DL (ref 31.5–35.7)
MCV RBC AUTO: 88.9 FL (ref 79–97)
MONOCYTES # BLD AUTO: 0.53 10*3/MM3 (ref 0.1–0.9)
MONOCYTES NFR BLD AUTO: 6.7 % (ref 5–12)
NEUTROPHILS # BLD AUTO: 5.36 10*3/MM3 (ref 1.7–7)
NEUTROPHILS NFR BLD AUTO: 67.6 % (ref 42.7–76)
NRBC BLD AUTO-RTO: 0 /100 WBC (ref 0–0.2)
PLATELET # BLD AUTO: 289 10*3/MM3 (ref 140–450)
POTASSIUM SERPL-SCNC: 4.1 MMOL/L (ref 3.5–5.2)
PROLACTIN SERPL-MCNC: 14.6 NG/ML (ref 4.8–33.4)
PROT SERPL-MCNC: 7.5 G/DL (ref 6–8.5)
RBC # BLD AUTO: 4.43 10*6/MM3 (ref 3.77–5.28)
SODIUM SERPL-SCNC: 138 MMOL/L (ref 136–145)
TESTOST SERPL-MCNC: 37 NG/DL (ref 13–71)
TRIGL SERPL-MCNC: 97 MG/DL (ref 0–150)
TSH SERPL DL<=0.005 MIU/L-ACNC: 1.4 UIU/ML (ref 0.27–4.2)
TSH SERPL DL<=0.005 MIU/L-ACNC: 1.4 UIU/ML (ref 0.27–4.2)
VLDLC SERPL CALC-MCNC: 18 MG/DL (ref 5–40)
WBC # BLD AUTO: 7.93 10*3/MM3 (ref 3.4–10.8)

## 2025-07-30 ENCOUNTER — TELEPHONE (OUTPATIENT)
Dept: CARDIOLOGY | Facility: CLINIC | Age: 30
End: 2025-07-30

## 2025-07-30 LAB — REF LAB TEST METHOD: NORMAL

## 2025-07-31 ENCOUNTER — TELEPHONE (OUTPATIENT)
Dept: FAMILY MEDICINE CLINIC | Facility: CLINIC | Age: 30
End: 2025-07-31
Payer: MEDICAID

## 2025-08-14 ENCOUNTER — OFFICE VISIT (OUTPATIENT)
Dept: OBSTETRICS AND GYNECOLOGY | Facility: CLINIC | Age: 30
End: 2025-08-14
Payer: MEDICAID

## 2025-08-14 VITALS
BODY MASS INDEX: 28.54 KG/M2 | HEIGHT: 66 IN | WEIGHT: 177.6 LBS | SYSTOLIC BLOOD PRESSURE: 130 MMHG | DIASTOLIC BLOOD PRESSURE: 82 MMHG

## 2025-08-14 DIAGNOSIS — E28.2 PCOS (POLYCYSTIC OVARIAN SYNDROME): Primary | ICD-10-CM

## 2025-08-14 DIAGNOSIS — L70.0 CYSTIC ACNE VULGARIS: ICD-10-CM

## 2025-08-14 PROCEDURE — 99213 OFFICE O/P EST LOW 20 MIN: CPT | Performed by: NURSE PRACTITIONER

## 2025-08-14 PROCEDURE — 1159F MED LIST DOCD IN RCRD: CPT | Performed by: NURSE PRACTITIONER

## 2025-08-14 PROCEDURE — 1160F RVW MEDS BY RX/DR IN RCRD: CPT | Performed by: NURSE PRACTITIONER
